# Patient Record
Sex: FEMALE | Race: WHITE | NOT HISPANIC OR LATINO | Employment: UNEMPLOYED | URBAN - METROPOLITAN AREA
[De-identification: names, ages, dates, MRNs, and addresses within clinical notes are randomized per-mention and may not be internally consistent; named-entity substitution may affect disease eponyms.]

---

## 2017-05-04 ENCOUNTER — ALLSCRIPTS OFFICE VISIT (OUTPATIENT)
Dept: OTHER | Facility: OTHER | Age: 67
End: 2017-05-04

## 2017-05-04 DIAGNOSIS — R10.9 ABDOMINAL PAIN: ICD-10-CM

## 2017-05-04 DIAGNOSIS — E03.9 HYPOTHYROIDISM: ICD-10-CM

## 2017-05-05 ENCOUNTER — APPOINTMENT (OUTPATIENT)
Dept: LAB | Facility: HOSPITAL | Age: 67
End: 2017-05-05
Payer: MEDICARE

## 2017-05-05 DIAGNOSIS — R10.9 ABDOMINAL PAIN: ICD-10-CM

## 2017-05-05 PROCEDURE — 87086 URINE CULTURE/COLONY COUNT: CPT

## 2017-05-06 ENCOUNTER — GENERIC CONVERSION - ENCOUNTER (OUTPATIENT)
Dept: OTHER | Facility: OTHER | Age: 67
End: 2017-05-06

## 2017-05-06 LAB — BACTERIA UR CULT: NORMAL

## 2017-08-07 DIAGNOSIS — E03.9 HYPOTHYROIDISM: ICD-10-CM

## 2017-08-17 ENCOUNTER — GENERIC CONVERSION - ENCOUNTER (OUTPATIENT)
Dept: OTHER | Facility: OTHER | Age: 67
End: 2017-08-17

## 2017-08-22 ENCOUNTER — GENERIC CONVERSION - ENCOUNTER (OUTPATIENT)
Dept: OTHER | Facility: OTHER | Age: 67
End: 2017-08-22

## 2017-08-25 ENCOUNTER — ALLSCRIPTS OFFICE VISIT (OUTPATIENT)
Dept: OTHER | Facility: OTHER | Age: 67
End: 2017-08-25

## 2017-10-02 DIAGNOSIS — E03.9 HYPOTHYROIDISM: ICD-10-CM

## 2017-11-16 ENCOUNTER — GENERIC CONVERSION - ENCOUNTER (OUTPATIENT)
Dept: OTHER | Facility: OTHER | Age: 67
End: 2017-11-16

## 2018-01-12 VITALS
SYSTOLIC BLOOD PRESSURE: 136 MMHG | HEIGHT: 66 IN | BODY MASS INDEX: 33.49 KG/M2 | TEMPERATURE: 96.8 F | HEART RATE: 87 BPM | DIASTOLIC BLOOD PRESSURE: 72 MMHG | RESPIRATION RATE: 16 BRPM | WEIGHT: 208.38 LBS

## 2018-01-12 NOTE — PROGRESS NOTES
Assessment    1  Encounter for preventive health examination (V70 0) (Z00 00)    Plan  Cough    · Promethazine-Codeine 6 25-10 MG/5ML Oral Syrup  Hypothyroidism    · (1) COMPREHENSIVE METABOLIC PANEL; Status:Active; Requested for:[id number];    · (1) LIPID PANEL FASTING W DIRECT LDL REFLEX; Status:Active; Requested  for:34Ism9918;   Screening for genitourinary condition    · *VB - Urinary Incontinence Screen (Dx Z13 89 Screen for UI); Status:Active; Requested  for:67Dnf0214;     Discussion/Summary    Due for lipid profile with next lab  Impression: Initial Annual Wellness Visit, with preventive exam as well as age and risk appropriate counseling completed  Cardiovascular screening and counseling: due for a lipid panel  Diabetes screening and counseling: screening is current  Colorectal cancer screening and counseling: screening is current  Breast cancer screening and counseling: screening is current  Cervical cancer screening and counseling: screening not indicated  Osteoporosis screening and counseling: due for DXA axial skeleton  Abdominal aortic aneurysm screening and counseling: screening not indicated  Glaucoma screening and counseling: screening is current  HIV screening and counseling: screening not indicated  Immunizations: the patient declines the influenza vaccination, the patient declines the pneumococcal vaccination, the patient declines the Zostavax vaccine and the patient declines the Tdap vaccine  Advance Directive Planning: not complete, she was encouraged to follow-up with me to discuss her questions and/or decisions  Patient Discussion: follow-up visit needed in one year  The treatment plan was reviewed with the patient/guardian   The patient/guardian understands and agrees with the treatment plan      Chief Complaint  Pt here for AWV      Advance Directives  Advance Directive [facility]:   The patient is in agreement to receive the Advance Care Planning service   Durable Power  [facility]:    Name: Nichole Gilbert   Relationship: son   Capacity/Competence: This patient has full decision making capacity for discussion of advance care planning and This patient has full decision making competency for discussion of advance care planning  History of Present Illness  Welcome to Medicare and Wellness Visits: The patient is being seen for the initial annual wellness visit  Medicare Screening and Risk Factors   Hospitalizations: she has been previously hospitalizied and she has been hospitalized 3-childbirth, breast surgery, abdominal pain times  Once per lifetime medicare screening tests: ECG has not been done and AAA screening US has not yet been done  Medicare Screening Tests Risk Questions   Abdominal aortic aneurysm risk assessment: none indicated  Osteoporosis risk assessment: , female gender and over 48years of age  HIV risk assessment: none indicated  Drug and Alcohol Use: The patient has never smoked cigarettes and has never used smokeless tobacco  The patient reports never drinking alcohol  Alcohol concern:   The patient has no concerns about alcohol abuse  She has never used illicit drugs  Diet and Physical Activity: Current diet includes low salt food choices, 1 servings of fruit per day, 1 servings of vegetables per day, 1 servings of meat per day, 3 servings of whole grains per day, 1 servings of simple carbohydrates per day, 1 servings of dairy products per day and 2 cups of coffee per day  She exercises 2-3 times per week  Exercise: walking 30 minutes per day  Mood Disorder and Cognitive Impairment Screening: PHQ-9 Depression Scale   Over the past 2 weeks, how often have you been bothered by the following problems? 1 ) Little interest or pleasure in doing things? Half the days or more  2 ) Feeling down, depressed or hopeless? Nearly every day  3 ) Trouble falling asleep or sleeping too much? Nearly every day     4 ) Feeling tired or having little energy? Nearly every day  5 ) Poor appetite or overeating? Not at all    6 ) Feeling bad about yourself, or that you are a failure, or have let yourself or your family down? Nearly every day    7 ) Trouble concentrating on things, such as reading a newspaper or watching television? Half the days or more  8 ) Moving or speaking so slowly that other people could have noticed, or the opposite, moving or speaking faster than usual? Several days  How difficult have these problems made it for you to do your work, take care of things at home, or get along with people? Somewhat difficult  Cognitive impairment screening: denies difficulty learning/retaining new information, denies difficulty handling complex tasks, denies difficulty with reasoning, denies difficulty with spatial ability and orientation, denies difficulty with language and denies difficulty with behavior  Functional Ability/Level of Safety: Hearing is slightly decreased, slightly decreased in the right ear and slightly decreased in the left ear  She uses a hearing aid  The patient is currently able to participate in social activities with limitations, but able to do activities of daily living without limitations, able to do instrumental activities of daily living without limitations and able to drive without limitations  Activities of daily living details: transportation help needed, meal preparation help needed and needs help managing medications, but does not need help using the phone, does not need help shopping, does not need help doing housework, does not need help doing laundry and does not need help managing money  Fall risk factors: The patient fell 0 times in the past 12 months  Injury History: urinary incontinence  Home safety risk factors:  no unfamiliar surroundings, no loose rugs, no poor household lighting, no uneven floors, no household clutter, grab bars in the bathroom and handrails on the stairs     Advance Directives: Advance directives: no living will, no durable power of  for health care directives and no advance directives  Co-Managers and Medical Equipment/Suppliers: See Patient Care Team   Preventive Quality Program 65 and Older: The patient is currently experiencing urinary symptoms  Urinary Incontinence Symptoms includes: urinary incontinence, incomplete bladder emptying, urinary frequency, urinary urgency, urinary hesitancy, dysuria, nocturia, straining, weak stream, intermittent stream, post-void dribbling, vaginal pressure and vaginal dryness        Patient Care Team    Care Team Member Role Specialty Office Number   [address] [facility] Specialist Surgical Oncology [phone]   Corby Kidd DO Specialist Ophthalmology [phone] [facility] Specialist Hematology Oncology [phone]   Eric Garcia MD Specialist Pulmonary Medicine [phone]   Anju Maya MD  Family Medicine [phone]     Review of Systems    Cardiovascular: no chest pain  Respiratory: no shortness of breath  Gastrointestinal: abdominal pain, diarrhea, constipation and constipation alternating with diarrhea  Genitourinary: urinary urgency  Neurological: no headache  Active Problems    1  Abdominal pain (789 00) (R10 9)   2  Anxiety (300 00) (F41 9)   3  Cough (786 2) (R05)   4  Depression (311) (F32 9)   5  Encounter for screening colonoscopy (V76 51) (Z12 11)   6  Encounter for special screening examination for genitourinary disorder (V81 6) (Z13 89)   7  Gallbladder disease (575 9) (K82 9)   8  Hypertension (401 9) (I10)   9  Hypothyroidism (244 9) (E03 9)   10  Left foot pain (729 5) (M79 672)   11  Lyme disease (088 81) (A69 20)   12  Mild intermittent asthma without complication (734 08) (E07 94)   13  Need for Tdap vaccination (V06 1) (Z23)   14  Need for vaccination with 13-polyvalent pneumococcal conjugate vaccine (V03 82) (Z23)   15   Urinary hesitancy (788 64) (R39 11)    Past Medical History    · History of Bladder pain (788 99) (R39 89)   · History of Knee pain (719 46) (M25 569)   · History of Sinusitis, acute, maxillary (461 0) (J01 00)   · History of URI (upper respiratory infection) (465 9) (J06 9)    The active problems and past medical history were reviewed and updated today  Surgical History    · History of Bladder Surgery   · History of Breast Surgery Lumpectomy    The surgical history was reviewed and updated today  Family History  Mother    · Family history of Tuberculosis    The family history was reviewed and updated today  Social History    · Always uses seat belt   · Apartment   · Caffeine use (V49 89) (F15 90)   · Denies alcohol consumption (V49 89) (Z78 9)   · Feels safe at home   · Living alone (V60 3) (Z60 2)   · Lyme disease (088 81) (A69 20)   · Never smoker   · No advance directives (V49 89) (Z78 9)   · No alcohol use   · No history of alcohol use (Z78 9)   · No living will   · No Advent beliefs   · One child   · Retired   · Single   · Single   · Supportive and safe  The social history was reviewed and updated today  Current Meds   1  Albuterol Sulfate (2 5 MG/3ML) 0 083% Inhalation Nebulization Solution; USE 1 UNIT   DOSE EVERY 4-6 HOURS AS NEEDED FOR WHEEZING ; Therapy: 37TZR9354 to (Evaluate:67Stn2853)  Requested for: 29TCE7257; Last   Rx:12Nov2015 Ordered   2  Gracey Thyroid 120 MG Oral Tablet; TAKE 1 TABLET DAILY; Therapy: 97ZVL5246 to (Unique Bis)  Requested for: 26Buf1693; Last   Rx:80Tvl0953 Ordered   3  Exemestane 25 MG Oral Tablet; USE AS DIRECTED Recorded   4  Losartan Potassium 100 MG Oral Tablet; TAKE 1 TABLET DAILY; Therapy: 69Tkd2671 to (Evaluate:13Jan2018)  Requested for: 96KOV4899; Last   Rx:71Wig4283 Ordered   5  Promethazine-Codeine 6 25-10 MG/5ML Oral Syrup; TAKE 5 ML EVERY 4 TO 6 HOURS   AS NEEDED FOR COUGH; Therapy: 98Ftg3300 to (Evaluate:14Jun2017); Last Rx:09Jun2017 Ordered   6  Proventil  (90 Base) MCG/ACT Inhalation Aerosol Solution; INHALE 1 PUFF   EVERY 4 HOURS AS NEEDED; Therapy: 53KNI4973 to (Evaluate:11Oct2015)  Requested for: 29Oxn6117; Last   Rx:19Wyr6612 Ordered   7  RaNITidine HCl - 150 MG Oral Tablet; take 1 tablet by mouth twice a day as needed foir   stomach; Therapy: 92PDQ9388 to (Milagro Perdomo)  Requested for: 24MPH0559; Last   RE:50ZTF8400 Ordered    The medication list was reviewed and updated today  Allergies    1  Cipro   2  Sulfa Drugs    3  Dust Mite   4  Grass   5  No Known Food Allergies    Immunizations   1    PPSV  Permanently Deferred: Medical Deferral, 93HJW6818    Tdap  Permanently Deferred: Medical Deferral, 97CET6039     Vitals  Signs    Temperature: 96 8 F  Heart Rate: 87  Respiration: 16  Systolic: 960  Diastolic: 72  Height: 5 ft 6 in  Weight: 208 lb 6 oz  BMI Calculated: 33 63  BSA Calculated: 2 04    Physical Exam    Constitutional   General appearance: Abnormal   obese and appears tired  Ears, Nose, Mouth, and Throat   Otoscopic examination: Tympanic membranes translucent with normal light reflex  Canals patent without erythema  Oropharynx: Normal with no erythema, edema, exudate or lesions  Neck   Thyroid: Normal, no thyromegaly  Pulmonary   Auscultation of lungs: Clear to auscultation  Cardiovascular   Auscultation of heart: Normal rate and rhythm, normal S1 and S2, no murmurs  Abdomen   Abdomen: Non-tender, no masses  Lymphatic   Palpation of lymph nodes in neck: No lymphadenopathy  Results/Data  PHQ-9 Adult Depression Screening 90Tdf6427 11:30AM User, Ahs     Test Name Result Flag Reference   PHQ-9 Adult Depression Score 13     Over the last two weeks, how often have you been bothered by any of the following problems?   Little interest or pleasure in doing things: Several days - 1  Feeling down, depressed, or hopeless: More than half the days - 2  Trouble falling or staying asleep, or sleeping too much: Several days - 1  Feeling tired or having little energy: More than half the days - 2  Poor appetite or over eating: Not at all - 0  Feeling bad about yourself - or that you are a failure or have let yourself or your family down: More than half the days - 2  Trouble concentrating on things, such as reading the newspaper or watching television: More than half the days - 2  Moving or speaking so slowly that other people could have noticed  Or the opposite -  being so fidgety or restless that you have been moving around a lot more than usual: Several days - 1  Thoughts that you would be better off dead, or of hurting yourself in some way: More than half the days - 2   PHQ-9 Adult Depression Screening Positive     PHQ-9 Difficulty Level Somewhat difficult     PHQ-9 Severity Moderate Depression         Signatures   Electronically signed by :  Kellen Toure MD; Aug 25 2017 12:11PM EST                       (Author)

## 2018-01-15 VITALS
HEIGHT: 66 IN | BODY MASS INDEX: 33.17 KG/M2 | SYSTOLIC BLOOD PRESSURE: 140 MMHG | TEMPERATURE: 99.1 F | HEART RATE: 85 BPM | DIASTOLIC BLOOD PRESSURE: 70 MMHG | WEIGHT: 206.38 LBS

## 2018-03-05 ENCOUNTER — TELEPHONE (OUTPATIENT)
Dept: FAMILY MEDICINE CLINIC | Facility: CLINIC | Age: 68
End: 2018-03-05

## 2018-03-05 DIAGNOSIS — I10 ESSENTIAL HYPERTENSION: ICD-10-CM

## 2018-03-05 DIAGNOSIS — E03.9 HYPOTHYROIDISM, UNSPECIFIED TYPE: Primary | ICD-10-CM

## 2018-03-05 NOTE — TELEPHONE ENCOUNTER
Pt spouse notified  Pls advise when new blood work script is generated, pt will like mailed to home

## 2018-03-05 NOTE — TELEPHONE ENCOUNTER
Patient called in stating she used her last prescription for her thyroid  Is she staying on this or does she need a test done?

## 2018-03-14 ENCOUNTER — TELEPHONE (OUTPATIENT)
Dept: FAMILY MEDICINE CLINIC | Facility: CLINIC | Age: 68
End: 2018-03-14

## 2018-03-14 NOTE — TELEPHONE ENCOUNTER
LVMTCB      ----- Message from Li Yeung MD sent at 3/14/2018 12:29 PM EDT -----  Cholesterol too high, thyroid overcorrected-due ov

## 2018-03-16 ENCOUNTER — TELEPHONE (OUTPATIENT)
Dept: FAMILY MEDICINE CLINIC | Facility: CLINIC | Age: 68
End: 2018-03-16

## 2018-03-16 ENCOUNTER — OFFICE VISIT (OUTPATIENT)
Dept: FAMILY MEDICINE CLINIC | Facility: CLINIC | Age: 68
End: 2018-03-16
Payer: MEDICARE

## 2018-03-16 VITALS
DIASTOLIC BLOOD PRESSURE: 82 MMHG | BODY MASS INDEX: 33.88 KG/M2 | TEMPERATURE: 98 F | HEIGHT: 65 IN | WEIGHT: 203.38 LBS | RESPIRATION RATE: 16 BRPM | HEART RATE: 80 BPM | SYSTOLIC BLOOD PRESSURE: 120 MMHG

## 2018-03-16 DIAGNOSIS — R10.9 ABDOMINAL PAIN, UNSPECIFIED ABDOMINAL LOCATION: ICD-10-CM

## 2018-03-16 DIAGNOSIS — E78.5 HYPERLIPIDEMIA, UNSPECIFIED HYPERLIPIDEMIA TYPE: ICD-10-CM

## 2018-03-16 DIAGNOSIS — R19.7 DIARRHEA, UNSPECIFIED TYPE: Primary | ICD-10-CM

## 2018-03-16 DIAGNOSIS — R10.12 LEFT UPPER QUADRANT PAIN: Primary | ICD-10-CM

## 2018-03-16 DIAGNOSIS — E03.9 HYPOTHYROIDISM, UNSPECIFIED TYPE: ICD-10-CM

## 2018-03-16 DIAGNOSIS — I10 ESSENTIAL HYPERTENSION: ICD-10-CM

## 2018-03-16 PROBLEM — R32 URINARY INCONTINENCE: Status: ACTIVE | Noted: 2017-08-25

## 2018-03-16 PROBLEM — J45.20 MILD INTERMITTENT ASTHMA WITHOUT COMPLICATION: Status: ACTIVE | Noted: 2017-05-04

## 2018-03-16 PROCEDURE — 99214 OFFICE O/P EST MOD 30 MIN: CPT | Performed by: FAMILY MEDICINE

## 2018-03-16 RX ORDER — ALBUTEROL SULFATE 2.5 MG/3ML
1 SOLUTION RESPIRATORY (INHALATION)
COMMUNITY
Start: 2015-11-12 | End: 2020-02-21 | Stop reason: ALTCHOICE

## 2018-03-16 RX ORDER — EXEMESTANE 25 MG/1
TABLET ORAL 2 TIMES DAILY
COMMUNITY
End: 2018-06-18 | Stop reason: SDUPTHER

## 2018-03-16 RX ORDER — MAGNESIUM GLUCONATE 30 MG(550)
30 TABLET ORAL
COMMUNITY

## 2018-03-16 RX ORDER — EXEMESTANE 25 MG/1
25 TABLET ORAL
COMMUNITY
Start: 2018-03-15 | End: 2018-03-16 | Stop reason: SDUPTHER

## 2018-03-16 RX ORDER — DIPHENOXYLATE HYDROCHLORIDE AND ATROPINE SULFATE 2.5; .025 MG/1; MG/1
1 TABLET ORAL
COMMUNITY

## 2018-03-16 RX ORDER — LEVOTHYROXINE AND LIOTHYRONINE 76; 18 UG/1; UG/1
TABLET ORAL
COMMUNITY
Start: 2017-02-16 | End: 2018-03-16 | Stop reason: SDUPTHER

## 2018-03-16 RX ORDER — RANITIDINE 150 MG/1
TABLET ORAL
COMMUNITY
Start: 2017-05-04 | End: 2020-02-21 | Stop reason: ALTCHOICE

## 2018-03-16 RX ORDER — ALBUTEROL SULFATE 90 UG/1
1 AEROSOL, METERED RESPIRATORY (INHALATION) EVERY 4 HOURS PRN
COMMUNITY
Start: 2014-10-03 | End: 2018-06-18 | Stop reason: SDUPTHER

## 2018-03-16 RX ORDER — COLESEVELAM 180 1/1
1875 TABLET ORAL 2 TIMES DAILY WITH MEALS
Qty: 60 TABLET | Refills: 5 | Status: SHIPPED | OUTPATIENT
Start: 2018-03-16 | End: 2018-03-23 | Stop reason: SDUPTHER

## 2018-03-16 RX ORDER — LOSARTAN POTASSIUM 100 MG/1
1 TABLET ORAL DAILY
COMMUNITY
Start: 2014-08-25 | End: 2018-06-18 | Stop reason: SDUPTHER

## 2018-03-16 RX ORDER — LEVOTHYROXINE AND LIOTHYRONINE 76; 18 UG/1; UG/1
1 TABLET ORAL DAILY
COMMUNITY
Start: 2015-09-30 | End: 2018-04-12 | Stop reason: SDUPTHER

## 2018-03-16 RX ORDER — LOSARTAN POTASSIUM 100 MG/1
100 TABLET ORAL
COMMUNITY
Start: 2014-02-24 | End: 2018-03-16 | Stop reason: SDUPTHER

## 2018-03-16 NOTE — TELEPHONE ENCOUNTER
Patient forgot to mention about getting checked for Hep C since she is a baby boomer  Pls advise  May mail out rx to her home address

## 2018-03-16 NOTE — PROGRESS NOTES
Assessment/Plan:    No problem-specific Assessment & Plan notes found for this encounter  There are no diagnoses linked to this encounter  Subjective:      Patient ID: Terese Irby is a 75 y o  female  Abdominal Pain   This is a chronic problem  The current episode started more than 1 year ago  The onset quality is undetermined  The problem occurs intermittently  The problem has been unchanged  The pain is located in the LUQ and left flank  The pain is at a severity of 4/10  The pain is moderate  The quality of the pain is colicky  Associated symptoms include belching, constipation, diarrhea, flatus and weight loss  Pertinent negatives include no anorexia  The pain is aggravated by eating  The pain is relieved by belching and passing flatus  She has tried antacids, H2 blockers and acetaminophen for the symptoms  The treatment provided mild relief  Prior diagnostic workup includes CT scan and lower endoscopy  There is no history of abdominal surgery  Thyroid Problem   Presents for follow-up visit  Symptoms include anxiety, constipation, diarrhea and weight loss  Patient reports no hair loss or weight gain  The symptoms have been stable  The following portions of the patient's history were reviewed and updated as appropriate: allergies, current medications, past family history, past medical history, past social history, past surgical history and problem list     Review of Systems   Constitutional: Positive for unexpected weight change and weight loss  Negative for weight gain  8 lb   Respiratory: Negative for shortness of breath  Cardiovascular: Negative for chest pain  Gastrointestinal: Positive for abdominal pain, constipation, diarrhea and flatus  Negative for anorexia  Hematological: Negative for adenopathy  Psychiatric/Behavioral: The patient is nervous/anxious            Objective:      /82 (BP Location: Right arm, Patient Position: Sitting, Cuff Size: Adult)   Pulse 80   Temp 98 °F (36 7 °C) (Temporal)   Resp 16   Ht 5' 5 05" (1 652 m)   Wt 92 3 kg (203 lb 6 oz)   BMI 33 79 kg/m²          Physical Exam   Constitutional: She appears well-developed and well-nourished  Neck: No thyromegaly present  Cardiovascular: Normal rate, regular rhythm and normal heart sounds  Pulmonary/Chest: Breath sounds normal    Abdominal: There is tenderness in the left upper quadrant  Musculoskeletal: She exhibits no edema  Lymphadenopathy:     She has no cervical adenopathy

## 2018-03-19 ENCOUNTER — TELEPHONE (OUTPATIENT)
Dept: FAMILY MEDICINE CLINIC | Facility: CLINIC | Age: 68
End: 2018-03-19

## 2018-03-20 NOTE — TELEPHONE ENCOUNTER
L/m on pt 's home # to c/b to inform her to call her ins  To see if colestipol would be covered under her plan since Welchol was rejected by her ins

## 2018-03-23 DIAGNOSIS — E78.5 HYPERLIPIDEMIA, UNSPECIFIED HYPERLIPIDEMIA TYPE: ICD-10-CM

## 2018-03-23 DIAGNOSIS — R19.7 DIARRHEA, UNSPECIFIED TYPE: ICD-10-CM

## 2018-03-23 RX ORDER — COLESEVELAM 180 1/1
1875 TABLET ORAL 2 TIMES DAILY WITH MEALS
Qty: 60 TABLET | Refills: 0 | Status: SHIPPED | OUTPATIENT
Start: 2018-03-23 | End: 2019-02-22 | Stop reason: SDUPTHER

## 2018-03-23 NOTE — TELEPHONE ENCOUNTER
No I cannot delete  That-just call pharmacy and tell them it is 1 tab bid #60-this is a limitation of prescribing through Epic

## 2018-03-23 NOTE — TELEPHONE ENCOUNTER
Instructions still say 3 tablets twice a day  Can you delete that 3 tablets and make it 1 tab bid  And resend to [facility]

## 2018-03-23 NOTE — TELEPHONE ENCOUNTER
Possible rejection for medication could be due to the instructions  As they do not match the frequency a day and or quantity  Instructions are to take 3 tabs by mouth two times a day with meals and 1 tablet for diarrhea  This does not make sense  If you only want patient taking this twice a day to match the qty pls fix instructions to 1 tab twice daily with meal  pls clarify   Thank you

## 2018-04-02 ENCOUNTER — TELEPHONE (OUTPATIENT)
Dept: FAMILY MEDICINE CLINIC | Facility: CLINIC | Age: 68
End: 2018-04-02

## 2018-04-02 NOTE — TELEPHONE ENCOUNTER
Pt called in following up on Hepatitis screening results  Pt went to [facility], results are in pt care everywhere  psl advise with results

## 2018-04-03 ENCOUNTER — TELEPHONE (OUTPATIENT)
Dept: FAMILY MEDICINE CLINIC | Facility: CLINIC | Age: 68
End: 2018-04-03

## 2018-04-06 ENCOUNTER — TELEPHONE (OUTPATIENT)
Dept: FAMILY MEDICINE CLINIC | Facility: CLINIC | Age: 68
End: 2018-04-06

## 2018-04-06 NOTE — TELEPHONE ENCOUNTER
lvmtcb          ----- Message from Randolph Beebe MD sent at 4/6/2018 10:57 AM EDT -----  Hep C negative

## 2018-04-12 ENCOUNTER — TELEPHONE (OUTPATIENT)
Dept: FAMILY MEDICINE CLINIC | Facility: CLINIC | Age: 68
End: 2018-04-12

## 2018-04-12 DIAGNOSIS — E03.9 HYPOTHYROIDISM, UNSPECIFIED TYPE: Primary | ICD-10-CM

## 2018-04-12 RX ORDER — LEVOTHYROXINE AND LIOTHYRONINE 76; 18 UG/1; UG/1
120 TABLET ORAL DAILY
Qty: 30 TABLET | Refills: 2 | Status: SHIPPED | OUTPATIENT
Start: 2018-04-12 | End: 2018-07-11 | Stop reason: SDUPTHER

## 2018-04-12 NOTE — TELEPHONE ENCOUNTER
LVMTCB for results      ----- Message from Dora Dinh MD sent at 4/12/2018  2:35 PM EDT -----  Lab all nl

## 2018-05-16 ENCOUNTER — TELEPHONE (OUTPATIENT)
Dept: FAMILY MEDICINE CLINIC | Facility: CLINIC | Age: 68
End: 2018-05-16

## 2018-05-16 DIAGNOSIS — R05.9 COUGH: Primary | ICD-10-CM

## 2018-05-16 RX ORDER — PROMETHAZINE HYDROCHLORIDE AND CODEINE PHOSPHATE 6.25; 1 MG/5ML; MG/5ML
5 SYRUP ORAL EVERY 4 HOURS PRN
Qty: 120 ML | Refills: 0 | Status: SHIPPED | OUTPATIENT
Start: 2018-05-16 | End: 2018-06-18 | Stop reason: CLARIF

## 2018-05-16 NOTE — TELEPHONE ENCOUNTER
Pt  Gets a Rx for her cough she has all the time for promethazine-codeine 6 25-10mg/5ml  Take 5ml every 4-6hrs as needed  It gets sent to [facility] and [facility]  Not listed in her current meds found it in allscripts

## 2018-06-18 ENCOUNTER — OFFICE VISIT (OUTPATIENT)
Dept: FAMILY MEDICINE CLINIC | Facility: CLINIC | Age: 68
End: 2018-06-18
Payer: MEDICARE

## 2018-06-18 VITALS
HEART RATE: 76 BPM | WEIGHT: 200.6 LBS | RESPIRATION RATE: 18 BRPM | HEIGHT: 65 IN | BODY MASS INDEX: 33.42 KG/M2 | SYSTOLIC BLOOD PRESSURE: 130 MMHG | DIASTOLIC BLOOD PRESSURE: 74 MMHG

## 2018-06-18 DIAGNOSIS — Z17.0 MALIGNANT NEOPLASM OF BREAST IN FEMALE, ESTROGEN RECEPTOR POSITIVE, UNSPECIFIED LATERALITY, UNSPECIFIED SITE OF BREAST (HCC): ICD-10-CM

## 2018-06-18 DIAGNOSIS — R06.00 DYSPNEA, UNSPECIFIED TYPE: ICD-10-CM

## 2018-06-18 DIAGNOSIS — C50.919 MALIGNANT NEOPLASM OF BREAST IN FEMALE, ESTROGEN RECEPTOR POSITIVE, UNSPECIFIED LATERALITY, UNSPECIFIED SITE OF BREAST (HCC): ICD-10-CM

## 2018-06-18 DIAGNOSIS — E03.9 HYPOTHYROIDISM, UNSPECIFIED TYPE: Primary | ICD-10-CM

## 2018-06-18 DIAGNOSIS — J45.20 MILD INTERMITTENT ASTHMA WITHOUT COMPLICATION: ICD-10-CM

## 2018-06-18 DIAGNOSIS — E78.5 HYPERLIPIDEMIA, UNSPECIFIED HYPERLIPIDEMIA TYPE: ICD-10-CM

## 2018-06-18 DIAGNOSIS — I10 ESSENTIAL HYPERTENSION: ICD-10-CM

## 2018-06-18 PROCEDURE — 99214 OFFICE O/P EST MOD 30 MIN: CPT | Performed by: FAMILY MEDICINE

## 2018-06-18 RX ORDER — EXEMESTANE 25 MG/1
25 TABLET ORAL DAILY
Qty: 30 TABLET | Refills: 0
Start: 2018-06-18

## 2018-06-18 RX ORDER — ALBUTEROL SULFATE 90 UG/1
1 AEROSOL, METERED RESPIRATORY (INHALATION) EVERY 4 HOURS PRN
Qty: 1 INHALER | Refills: 5 | Status: SHIPPED | OUTPATIENT
Start: 2018-06-18 | End: 2019-06-21 | Stop reason: ALTCHOICE

## 2018-06-18 RX ORDER — LOSARTAN POTASSIUM 100 MG/1
100 TABLET ORAL DAILY
Qty: 30 TABLET | Refills: 5 | Status: SHIPPED | OUTPATIENT
Start: 2018-06-18 | End: 2019-01-08 | Stop reason: SDUPTHER

## 2018-06-18 NOTE — ASSESSMENT & PLAN NOTE
Has been on 5 years-new doc wants her on another 5 years-? whether could take every other day due to hot flashes

## 2018-06-18 NOTE — PATIENT INSTRUCTIONS
Check with cancer doc whether could take med for breast cancer every other day to decrease hot flashes, restart inhaler due to wheezing,check with pharmacy whether they give tetanus shot

## 2018-06-18 NOTE — PROGRESS NOTES
Assessment/Plan:    No problem-specific Assessment & Plan notes found for this encounter  There are no diagnoses linked to this encounter  Subjective:      Patient ID: Ismael Jo is a 70 y o  female  Hypertension   This is a chronic problem  The current episode started more than 1 year ago  The problem is unchanged  The problem is controlled  Associated symptoms include chest pain and shortness of breath  Pertinent negatives include no anxiety, headaches or peripheral edema  Risk factors for coronary artery disease include obesity, post-menopausal state and dyslipidemia  Past treatments include angiotensin blockers  The current treatment provides no improvement  There are no compliance problems  The following portions of the patient's history were reviewed and updated as appropriate: allergies, current medications, past family history, past medical history, past social history, past surgical history and problem list       Review of Systems   Constitutional: Negative for chills, fatigue and unexpected weight change  Hot flashes   Respiratory: Positive for cough, shortness of breath and wheezing  Cardiovascular: Positive for chest pain  Gastrointestinal: Positive for abdominal pain  Negative for constipation  Neurological: Negative for headaches  Objective:      /74 (BP Location: Right arm, Patient Position: Sitting, Cuff Size: Adult)   Pulse 76   Resp 18   Ht 5' 5 05" (1 652 m)   Wt 91 kg (200 lb 9 6 oz)   BMI 33 33 kg/m²          Physical Exam   Constitutional: She appears well-developed and well-nourished  Neck: No thyromegaly present  Cardiovascular: Normal rate, regular rhythm and normal heart sounds  Pulmonary/Chest: She has wheezes in the right lower field  Abdominal: She exhibits no distension  Lymphadenopathy:     She has no cervical adenopathy  Vitals reviewed

## 2018-06-22 ENCOUNTER — TELEPHONE (OUTPATIENT)
Dept: FAMILY MEDICINE CLINIC | Facility: CLINIC | Age: 68
End: 2018-06-22

## 2018-06-22 NOTE — TELEPHONE ENCOUNTER
Patient will be having a stress test and she wants to know if he can have her Blood pressure, thyroid and cancer medication  Please advice

## 2018-06-29 ENCOUNTER — TELEPHONE (OUTPATIENT)
Dept: FAMILY MEDICINE CLINIC | Facility: CLINIC | Age: 68
End: 2018-06-29

## 2018-06-29 NOTE — TELEPHONE ENCOUNTER
Pt stopped in office was just at hospital for stress test  She stopped over and complains of having hives for past 3-4 days have hives all over body  Very itchy  Ester Caldera who was with her believes her nerves is bringing out the hives/rash  Lips are swollen  She has not eaten or taken anything out of the ordinary  Not allergic to anything or any medications  Hospital recommended her to follow up with pcp  Did not tell them to go to ER  Pls advise

## 2018-07-10 DIAGNOSIS — E03.9 HYPOTHYROIDISM, UNSPECIFIED TYPE: ICD-10-CM

## 2018-07-10 RX ORDER — THYROID, PORCINE 120 MG/1
TABLET ORAL
Qty: 30 TABLET | OUTPATIENT
Start: 2018-07-10

## 2018-07-11 DIAGNOSIS — E03.9 HYPOTHYROIDISM, UNSPECIFIED TYPE: ICD-10-CM

## 2018-07-11 RX ORDER — LEVOTHYROXINE AND LIOTHYRONINE 76; 18 UG/1; UG/1
120 TABLET ORAL DAILY
Qty: 30 TABLET | Refills: 5 | Status: SHIPPED | OUTPATIENT
Start: 2018-07-11 | End: 2019-02-05 | Stop reason: SDUPTHER

## 2018-07-12 ENCOUNTER — OFFICE VISIT (OUTPATIENT)
Dept: FAMILY MEDICINE CLINIC | Facility: CLINIC | Age: 68
End: 2018-07-12
Payer: MEDICARE

## 2018-07-12 VITALS
WEIGHT: 200.13 LBS | HEIGHT: 65 IN | SYSTOLIC BLOOD PRESSURE: 126 MMHG | BODY MASS INDEX: 33.34 KG/M2 | DIASTOLIC BLOOD PRESSURE: 68 MMHG | TEMPERATURE: 99 F | RESPIRATION RATE: 18 BRPM | HEART RATE: 76 BPM

## 2018-07-12 DIAGNOSIS — L50.9 URTICARIA: Primary | ICD-10-CM

## 2018-07-12 PROCEDURE — 99213 OFFICE O/P EST LOW 20 MIN: CPT | Performed by: FAMILY MEDICINE

## 2018-07-12 RX ORDER — METHYLPREDNISOLONE 4 MG/1
TABLET ORAL
Qty: 21 TABLET | Refills: 0 | Status: SHIPPED | OUTPATIENT
Start: 2018-07-12 | End: 2018-10-22 | Stop reason: CLARIF

## 2018-07-12 NOTE — PROGRESS NOTES
Assessment/Plan:    No problem-specific Assessment & Plan notes found for this encounter  There are no diagnoses linked to this encounter  Subjective:      Patient ID: Lisseth Vogt is a 70 y o  female  Rash   This is a new problem  The current episode started 1 to 4 weeks ago  The problem has been gradually improving since onset  The rash is diffuse (was worked up about stress test)  The rash is characterized by itchiness and redness (lips even swelled up)  It is unknown if there was an exposure to a precipitant  Pertinent negatives include no facial edema or shortness of breath  Past treatments include antihistamine  The treatment provided mild relief  The following portions of the patient's history were reviewed and updated as appropriate: allergies, current medications, past family history, past medical history, past social history, past surgical history and problem list       Review of Systems   Constitutional: Negative for activity change, appetite change and unexpected weight change  Respiratory: Negative for shortness of breath  Cardiovascular: Negative for chest pain  Musculoskeletal: Negative for arthralgias  Skin: Positive for rash  Neurological: Negative for headaches  Psychiatric/Behavioral: The patient is nervous/anxious  Objective:      /68 (BP Location: Right arm, Patient Position: Sitting, Cuff Size: Standard)   Pulse 76   Temp 99 °F (37 2 °C) (Temporal)   Resp 18   Ht 5' 5 05" (1 652 m)   Wt 90 8 kg (200 lb 2 oz)   BMI 33 25 kg/m²          Physical Exam   Constitutional: She appears well-developed and well-nourished  Neck: No thyromegaly present  Cardiovascular: Normal rate, regular rhythm and normal heart sounds  Pulmonary/Chest: Breath sounds normal    Lymphadenopathy:     She has no cervical adenopathy  Skin: Rash noted  Vitals reviewed

## 2018-08-16 ENCOUNTER — TELEPHONE (OUTPATIENT)
Dept: FAMILY MEDICINE CLINIC | Facility: CLINIC | Age: 68
End: 2018-08-16

## 2018-08-16 DIAGNOSIS — E03.9 HYPOTHYROIDISM, UNSPECIFIED TYPE: Primary | ICD-10-CM

## 2018-08-16 NOTE — TELEPHONE ENCOUNTER
----- Message from Aranza Hutchinson MD sent at 8/16/2018  9:11 AM EDT -----  Thyroid overcorrected-stop thyroid med, cholesterol too high-did she stop welchol? If not would add zetia 10 mg/day -30 day or 90 day?  Recheck thyroid lab 6  weeks

## 2018-08-23 DIAGNOSIS — E78.5 HYPERLIPIDEMIA, UNSPECIFIED HYPERLIPIDEMIA TYPE: Primary | ICD-10-CM

## 2018-08-23 RX ORDER — EZETIMIBE 10 MG/1
10 TABLET ORAL DAILY
Qty: 30 TABLET | Refills: 5 | Status: SHIPPED | OUTPATIENT
Start: 2018-08-23 | End: 2019-02-05 | Stop reason: SDUPTHER

## 2018-09-24 ENCOUNTER — TELEPHONE (OUTPATIENT)
Dept: FAMILY MEDICINE CLINIC | Facility: CLINIC | Age: 68
End: 2018-09-24

## 2018-09-24 DIAGNOSIS — E03.9 HYPOTHYROIDISM, UNSPECIFIED TYPE: Primary | ICD-10-CM

## 2018-09-24 NOTE — TELEPHONE ENCOUNTER
----- Message from Julio Nicole MD sent at 9/24/2018  6:08 PM EDT -----  Thyroid not too underactive-restart meds, rceheck T4/TSH  1  month

## 2018-10-22 ENCOUNTER — OFFICE VISIT (OUTPATIENT)
Dept: FAMILY MEDICINE CLINIC | Facility: CLINIC | Age: 68
End: 2018-10-22
Payer: MEDICARE

## 2018-10-22 VITALS
TEMPERATURE: 98.9 F | WEIGHT: 210.4 LBS | SYSTOLIC BLOOD PRESSURE: 134 MMHG | HEART RATE: 78 BPM | BODY MASS INDEX: 35.06 KG/M2 | RESPIRATION RATE: 18 BRPM | HEIGHT: 65 IN | DIASTOLIC BLOOD PRESSURE: 72 MMHG

## 2018-10-22 DIAGNOSIS — E03.9 HYPOTHYROIDISM, UNSPECIFIED TYPE: Primary | ICD-10-CM

## 2018-10-22 DIAGNOSIS — E78.5 HYPERLIPIDEMIA, UNSPECIFIED HYPERLIPIDEMIA TYPE: ICD-10-CM

## 2018-10-22 PROCEDURE — 99213 OFFICE O/P EST LOW 20 MIN: CPT | Performed by: FAMILY MEDICINE

## 2018-10-22 NOTE — PROGRESS NOTES
Assessment/Plan:    No problem-specific Assessment & Plan notes found for this encounter  Diagnoses and all orders for this visit:    Hypothyroidism, unspecified type  -     Lipid Panel with Direct LDL reflex; Future  -     TSH, 3rd generation; Future    Hyperlipidemia, unspecified hyperlipidemia type  -     Lipid Panel with Direct LDL reflex; Future  -     TSH, 3rd generation; Future          Subjective:      Patient ID: Felipe Mendoza is a 70 y o  female  Hypertension   This is a chronic problem  The current episode started more than 1 year ago  The problem is unchanged  The problem is controlled  Pertinent negatives include no anxiety, blurred vision, chest pain, headaches, peripheral edema or shortness of breath  There are no associated agents to hypertension  Risk factors for coronary artery disease include obesity, sedentary lifestyle and post-menopausal state  Past treatments include angiotensin blockers  The current treatment provides significant improvement  There are no compliance problems  Identifiable causes of hypertension include a thyroid problem  Thyroid Problem   Presents for follow-up visit  Patient reports no anxiety, cold intolerance, constipation, depressed mood, diarrhea, fatigue, weight gain or weight loss  The symptoms have been stable  The following portions of the patient's history were reviewed and updated as appropriate: allergies, current medications, past family history, past medical history, past social history, past surgical history and problem list       Review of Systems   Constitutional: Negative for activity change, appetite change, fatigue, weight gain and weight loss  Eyes: Negative for blurred vision  Respiratory: Negative for shortness of breath  Cardiovascular: Negative for chest pain  Gastrointestinal: Negative for constipation and diarrhea  Endocrine: Negative for cold intolerance  Neurological: Negative for headaches     Psychiatric/Behavioral: The patient is not nervous/anxious  Objective:      /72   Pulse 78   Temp 98 9 °F (37 2 °C) (Temporal)   Resp 18   Ht 5' 5 05" (1 652 m)   Wt 95 4 kg (210 lb 6 4 oz)   BMI 34 96 kg/m²          Physical Exam   Constitutional: She appears well-developed and well-nourished  Neck: No thyromegaly present  Cardiovascular: Normal rate, regular rhythm and normal heart sounds  Pulmonary/Chest: Breath sounds normal    Musculoskeletal: She exhibits no edema  Lymphadenopathy:     She has no cervical adenopathy  Vitals reviewed

## 2019-01-08 DIAGNOSIS — I10 ESSENTIAL HYPERTENSION: ICD-10-CM

## 2019-01-08 RX ORDER — LOSARTAN POTASSIUM 100 MG/1
100 TABLET ORAL DAILY
Qty: 30 TABLET | Refills: 0 | Status: SHIPPED | OUTPATIENT
Start: 2019-01-08 | End: 2019-02-22 | Stop reason: SDUPTHER

## 2019-01-15 ENCOUNTER — TELEPHONE (OUTPATIENT)
Dept: FAMILY MEDICINE CLINIC | Facility: CLINIC | Age: 69
End: 2019-01-15

## 2019-01-15 NOTE — TELEPHONE ENCOUNTER
----- Message from Chelsy Delarosa MD sent at 1/14/2019 10:34 AM EST -----  Lipids really not better, thyroid stable-is pt still taking zetia (ezetimide)

## 2019-02-05 DIAGNOSIS — E78.5 HYPERLIPIDEMIA, UNSPECIFIED HYPERLIPIDEMIA TYPE: ICD-10-CM

## 2019-02-05 DIAGNOSIS — E03.9 HYPOTHYROIDISM, UNSPECIFIED TYPE: ICD-10-CM

## 2019-02-06 RX ORDER — EZETIMIBE 10 MG/1
10 TABLET ORAL DAILY
Qty: 30 TABLET | Refills: 5 | Status: SHIPPED | OUTPATIENT
Start: 2019-02-06

## 2019-02-06 RX ORDER — LEVOTHYROXINE AND LIOTHYRONINE 76; 18 UG/1; UG/1
120 TABLET ORAL DAILY
Qty: 30 TABLET | Refills: 5 | Status: SHIPPED | OUTPATIENT
Start: 2019-02-06 | End: 2019-06-21 | Stop reason: SDUPTHER

## 2019-02-22 ENCOUNTER — OFFICE VISIT (OUTPATIENT)
Dept: FAMILY MEDICINE CLINIC | Facility: CLINIC | Age: 69
End: 2019-02-22
Payer: MEDICARE

## 2019-02-22 VITALS
SYSTOLIC BLOOD PRESSURE: 134 MMHG | HEART RATE: 79 BPM | BODY MASS INDEX: 34.82 KG/M2 | WEIGHT: 209 LBS | DIASTOLIC BLOOD PRESSURE: 80 MMHG | TEMPERATURE: 97.9 F | OXYGEN SATURATION: 97 % | HEIGHT: 65 IN

## 2019-02-22 DIAGNOSIS — E78.5 HYPERLIPIDEMIA, UNSPECIFIED HYPERLIPIDEMIA TYPE: ICD-10-CM

## 2019-02-22 DIAGNOSIS — Z23 NEED FOR PNEUMOCOCCAL VACCINATION: Primary | ICD-10-CM

## 2019-02-22 DIAGNOSIS — I10 ESSENTIAL HYPERTENSION: ICD-10-CM

## 2019-02-22 DIAGNOSIS — R19.7 DIARRHEA, UNSPECIFIED TYPE: ICD-10-CM

## 2019-02-22 PROCEDURE — 99213 OFFICE O/P EST LOW 20 MIN: CPT | Performed by: FAMILY MEDICINE

## 2019-02-22 PROCEDURE — 90670 PCV13 VACCINE IM: CPT | Performed by: FAMILY MEDICINE

## 2019-02-22 PROCEDURE — G0009 ADMIN PNEUMOCOCCAL VACCINE: HCPCS | Performed by: FAMILY MEDICINE

## 2019-02-22 RX ORDER — COLESEVELAM 180 1/1
TABLET ORAL
Qty: 120 TABLET | Refills: 5 | Status: SHIPPED | OUTPATIENT
Start: 2019-02-22 | End: 2019-03-04

## 2019-02-22 RX ORDER — LOSARTAN POTASSIUM 100 MG/1
100 TABLET ORAL DAILY
Qty: 30 TABLET | Refills: 5 | Status: SHIPPED | OUTPATIENT
Start: 2019-02-22 | End: 2019-08-05 | Stop reason: SDUPTHER

## 2019-02-22 NOTE — PROGRESS NOTES
Assessment/Plan:    Essential hypertension  bp stable    Hyperlipidemia  Change welchol to 2 twice per day       Diagnoses and all orders for this visit:    Need for pneumococcal vaccination  -     PNEUMOCOCCAL CONJUGATE VACCINE 13-VALENT GREATER THAN 6 MONTHS    Essential hypertension  -     losartan (COZAAR) 100 MG tablet; Take 1 tablet (100 mg total) by mouth daily    Diarrhea, unspecified type  -     colesevelam (WELCHOL) 625 mg tablet; 2 tablet twice per day    Hyperlipidemia, unspecified hyperlipidemia type  -     colesevelam (WELCHOL) 625 mg tablet; 2 tablet twice per day  -     Lipid Panel with Direct LDL reflex; Future  -     Comprehensive metabolic panel; Future    Other orders  -     Omega-3 Fatty Acids (FISH OIL PO); Take 2 g by mouth          Subjective:      Patient ID: Sampson Lombardo is a 70 y o  female  Hypertension   This is a chronic problem  The current episode started more than 1 year ago  The problem is unchanged  The problem is controlled  Pertinent negatives include no anxiety, blurred vision, chest pain, headaches, peripheral edema or shortness of breath  There are no associated agents to hypertension  Risk factors for coronary artery disease include post-menopausal state, obesity and dyslipidemia  Past treatments include angiotensin blockers  The current treatment provides significant improvement  There are no compliance problems  The following portions of the patient's history were reviewed and updated as appropriate: allergies, current medications, past family history, past medical history, past social history, past surgical history and problem list       Review of Systems   Constitutional: Negative for activity change, appetite change and unexpected weight change  Eyes: Negative for blurred vision  Respiratory: Negative for shortness of breath  Cardiovascular: Negative for chest pain  Neurological: Negative for headaches     Psychiatric/Behavioral: The patient is not nervous/anxious  Objective:      /80 (BP Location: Right arm, Patient Position: Sitting, Cuff Size: Adult)   Pulse 79   Temp 97 9 °F (36 6 °C) (Temporal)   Ht 5' 5" (1 651 m)   Wt 94 8 kg (209 lb)   SpO2 97%   BMI 34 78 kg/m²          Physical Exam   Constitutional: She appears well-developed and well-nourished  Neck: No thyromegaly present  Cardiovascular: Normal rate, regular rhythm and normal heart sounds  Pulmonary/Chest: Effort normal and breath sounds normal    Musculoskeletal: She exhibits no edema  Lymphadenopathy:     She has no cervical adenopathy  Vitals reviewed

## 2019-03-04 ENCOUNTER — TELEPHONE (OUTPATIENT)
Dept: FAMILY MEDICINE CLINIC | Facility: CLINIC | Age: 69
End: 2019-03-04

## 2019-03-04 DIAGNOSIS — E78.5 HYPERLIPIDEMIA, UNSPECIFIED HYPERLIPIDEMIA TYPE: Primary | ICD-10-CM

## 2019-03-04 RX ORDER — MONTELUKAST SODIUM 4 MG/1
1 TABLET, CHEWABLE ORAL 2 TIMES DAILY
Qty: 60 TABLET | Refills: 3 | Status: SHIPPED | OUTPATIENT
Start: 2019-03-04 | End: 2019-06-21 | Stop reason: CLARIF

## 2019-03-04 NOTE — TELEPHONE ENCOUNTER
Welchol given to the patient was too expensive, asking if she can have an alternative medication to [address] [facility]

## 2019-05-23 ENCOUNTER — TELEPHONE (OUTPATIENT)
Dept: FAMILY MEDICINE CLINIC | Facility: CLINIC | Age: 69
End: 2019-05-23

## 2019-06-12 ENCOUNTER — TELEPHONE (OUTPATIENT)
Dept: FAMILY MEDICINE CLINIC | Facility: CLINIC | Age: 69
End: 2019-06-12

## 2019-06-12 DIAGNOSIS — Z12.39 SCREENING FOR BREAST CANCER: Primary | ICD-10-CM

## 2019-06-21 ENCOUNTER — OFFICE VISIT (OUTPATIENT)
Dept: FAMILY MEDICINE CLINIC | Facility: CLINIC | Age: 69
End: 2019-06-21
Payer: MEDICARE

## 2019-06-21 VITALS
DIASTOLIC BLOOD PRESSURE: 82 MMHG | BODY MASS INDEX: 34.02 KG/M2 | SYSTOLIC BLOOD PRESSURE: 120 MMHG | HEART RATE: 74 BPM | RESPIRATION RATE: 18 BRPM | HEIGHT: 65 IN | WEIGHT: 204.2 LBS | TEMPERATURE: 98.6 F

## 2019-06-21 DIAGNOSIS — E03.9 HYPOTHYROIDISM, UNSPECIFIED TYPE: ICD-10-CM

## 2019-06-21 DIAGNOSIS — E78.5 HYPERLIPIDEMIA, UNSPECIFIED HYPERLIPIDEMIA TYPE: ICD-10-CM

## 2019-06-21 DIAGNOSIS — I10 ESSENTIAL HYPERTENSION: ICD-10-CM

## 2019-06-21 DIAGNOSIS — Z00.00 MEDICARE ANNUAL WELLNESS VISIT, SUBSEQUENT: Primary | ICD-10-CM

## 2019-06-21 DIAGNOSIS — M54.50 ACUTE BILATERAL LOW BACK PAIN WITHOUT SCIATICA: ICD-10-CM

## 2019-06-21 PROCEDURE — 99214 OFFICE O/P EST MOD 30 MIN: CPT | Performed by: FAMILY MEDICINE

## 2019-06-21 PROCEDURE — G0439 PPPS, SUBSEQ VISIT: HCPCS | Performed by: FAMILY MEDICINE

## 2019-06-21 RX ORDER — LEVOTHYROXINE AND LIOTHYRONINE 76; 18 UG/1; UG/1
120 TABLET ORAL DAILY
Qty: 30 TABLET | Refills: 5 | Status: SHIPPED | OUTPATIENT
Start: 2019-06-21 | End: 2019-10-21 | Stop reason: SDUPTHER

## 2019-06-21 RX ORDER — FENOFIBRATE 54 MG/1
54 TABLET ORAL DAILY
Qty: 30 TABLET | Refills: 5 | Status: SHIPPED | OUTPATIENT
Start: 2019-06-21 | End: 2020-02-21 | Stop reason: ALTCHOICE

## 2019-08-05 DIAGNOSIS — I10 ESSENTIAL HYPERTENSION: ICD-10-CM

## 2019-08-05 RX ORDER — LOSARTAN POTASSIUM 100 MG/1
TABLET ORAL
Qty: 30 TABLET | Refills: 5 | Status: SHIPPED | OUTPATIENT
Start: 2019-08-05 | End: 2020-02-24 | Stop reason: SDUPTHER

## 2019-10-21 ENCOUNTER — OFFICE VISIT (OUTPATIENT)
Dept: FAMILY MEDICINE CLINIC | Facility: CLINIC | Age: 69
End: 2019-10-21
Payer: MEDICARE

## 2019-10-21 VITALS
DIASTOLIC BLOOD PRESSURE: 62 MMHG | WEIGHT: 200 LBS | HEART RATE: 68 BPM | HEIGHT: 65 IN | BODY MASS INDEX: 33.32 KG/M2 | SYSTOLIC BLOOD PRESSURE: 124 MMHG

## 2019-10-21 DIAGNOSIS — H66.002 ACUTE SUPPURATIVE OTITIS MEDIA OF LEFT EAR WITHOUT SPONTANEOUS RUPTURE OF TYMPANIC MEMBRANE, RECURRENCE NOT SPECIFIED: ICD-10-CM

## 2019-10-21 DIAGNOSIS — E78.5 HYPERLIPIDEMIA, UNSPECIFIED HYPERLIPIDEMIA TYPE: Primary | ICD-10-CM

## 2019-10-21 DIAGNOSIS — R20.0 HAND NUMBNESS: ICD-10-CM

## 2019-10-21 DIAGNOSIS — I10 ESSENTIAL HYPERTENSION: ICD-10-CM

## 2019-10-21 DIAGNOSIS — E03.9 HYPOTHYROIDISM, UNSPECIFIED TYPE: ICD-10-CM

## 2019-10-21 PROCEDURE — 99214 OFFICE O/P EST MOD 30 MIN: CPT | Performed by: FAMILY MEDICINE

## 2019-10-21 RX ORDER — PROMETHAZINE HYDROCHLORIDE AND CODEINE PHOSPHATE 6.25; 1 MG/5ML; MG/5ML
5 SYRUP ORAL EVERY 4 HOURS PRN
Qty: 120 ML | Refills: 0 | Status: SHIPPED | OUTPATIENT
Start: 2019-10-21 | End: 2020-03-04 | Stop reason: CLARIF

## 2019-10-21 RX ORDER — LEVOTHYROXINE AND LIOTHYRONINE 76; 18 UG/1; UG/1
120 TABLET ORAL EVERY OTHER DAY
Qty: 30 TABLET | Refills: 5 | Status: SHIPPED | OUTPATIENT
Start: 2019-10-21 | End: 2020-02-24 | Stop reason: SDUPTHER

## 2019-10-21 RX ORDER — AZITHROMYCIN 250 MG/1
TABLET, FILM COATED ORAL
Qty: 6 TABLET | Refills: 0 | Status: SHIPPED | OUTPATIENT
Start: 2019-10-21 | End: 2019-10-25

## 2019-10-21 NOTE — PROGRESS NOTES
Assessment and Plan:    Problem List Items Addressed This Visit        Endocrine    Hypothyroidism     Thyroid  overcorrected         Relevant Medications    thyroid (ARMOUR THYROID) 120 MG tablet                 Diagnoses and all orders for this visit:    Hypothyroidism, unspecified type  -     thyroid (ARMOUR THYROID) 120 MG tablet; Take 1 tablet (120 mg total) by mouth every other day              Subjective:      Patient ID: Marisela Fitch is a 78 y o  female  CC:    Chief Complaint   Patient presents with    Follow-up     patient is here for a 4 month f/u re: chronic medical conditions  Patient c/o ongoing dry & productive cough  patient used all her cough medicine  ak        HPI:    F/u lipids, thyroid, really trying to eat healthy, having numb hands, some pulling sensation in chest  Some shortness of breath      The following portions of the patient's history were reviewed and updated as appropriate: allergies, current medications, past family history, past medical history, past social history, past surgical history and problem list         Review of Systems   Constitutional: Positive for unexpected weight change  Negative for activity change and appetite change  8 lb loss   Respiratory: Positive for cough and shortness of breath  Green mucus   Cardiovascular: Positive for chest pain and leg swelling  Feels pulling sensation from where she had surgery   Musculoskeletal: Negative for arthralgias  Neurological: Positive for weakness, numbness and headaches  Data to review:       Objective:    Vitals:    10/21/19 1404   BP: 124/62   Pulse: 68   Weight: 90 7 kg (200 lb)   Height: 5' 5" (1 651 m)        Physical Exam   Constitutional: She appears well-developed and well-nourished  HENT:   Right Ear: Tympanic membrane normal    Left Ear: Tympanic membrane is erythematous  A middle ear effusion is present  Nose: No mucosal edema or rhinorrhea     Mouth/Throat: Posterior oropharyngeal erythema present  No oropharyngeal exudate or posterior oropharyngeal edema  Neck: No thyromegaly present  Cardiovascular: Normal rate, regular rhythm and normal heart sounds  Pulmonary/Chest: Effort normal and breath sounds normal    Musculoskeletal: She exhibits edema  Trace edema   Lymphadenopathy:     She has no cervical adenopathy  Neurological: No sensory deficit  Negative Tinel's/Phalen's   Vitals reviewed

## 2019-10-21 NOTE — PATIENT INSTRUCTIONS
Take thyroid med every other day, try B6 200 mg/day for hands    Heart Healthy Diet   WHAT YOU NEED TO KNOW:   A heart healthy diet is an eating plan low in total fat, unhealthy fats, and sodium (salt)  A heart healthy diet helps decrease your risk for heart disease and stroke  Limit the amount of fat you eat to 25% to 35% of your total daily calories  Limit sodium to less than 2,300 mg each day  DISCHARGE INSTRUCTIONS:   Healthy fats:  Healthy fats can help improve cholesterol levels  The risk for heart disease is decreased when cholesterol levels are normal  Choose healthy fats, such as the following:  · Unsaturated fat  is found in foods such as soybean, canola, olive, corn, and safflower oils  It is also found in soft tub margarine that is made with liquid vegetable oil  · Omega-3 fat  is found in certain fish, such as salmon, tuna, and trout, and in walnuts and flaxseed  Unhealthy fats:  Unhealthy fats can cause unhealthy cholesterol levels in your blood and increase your risk of heart disease  Limit unhealthy fats, such as the following:  · Cholesterol  is found in animal foods, such as eggs and lobster, and in dairy products made from whole milk  Limit cholesterol to less than 300 milligrams (mg) each day  You may need to limit cholesterol to 200 mg each day if you have heart disease  · Saturated fat  is found in meats, such as liao and hamburger  It is also found in chicken or turkey skin, whole milk, and butter  Limit saturated fat to less than 7% of your total daily calories  Limit saturated fat to less than 6% if you have heart disease or are at increased risk for it  · Trans fat  is found in packaged foods, such as potato chips and cookies  It is also in hard margarine, some fried foods, and shortening  Avoid trans fats as much as possible    Heart healthy foods and drinks to include:  Ask your dietitian or healthcare provider how many servings to have from each of the following food groups:  · Grains:      ¨ Whole-wheat breads, cereals, and pastas, and brown rice    ¨ Low-fat, low-sodium crackers and chips    · Vegetables:      ¨ Broccoli, green beans, green peas, and spinach    ¨ Collards, kale, and lima beans    ¨ Carrots, sweet potatoes, tomatoes, and peppers    ¨ Canned vegetables with no salt added    · Fruits:      ¨ Bananas, peaches, pears, and pineapple    ¨ Grapes, raisins, and dates    ¨ Oranges, tangerines, grapefruit, orange juice, and grapefruit juice    ¨ Apricots, mangoes, melons, and papaya    ¨ Raspberries and strawberries    ¨ Canned fruit with no added sugar    · Low-fat dairy products:      ¨ Nonfat (skim) milk, 1% milk, and low-fat almond, cashew, or soy milks fortified with calcium    ¨ Low-fat cheese, regular or frozen yogurt, and cottage cheese    · Meats and proteins , such as lean cuts of beef and pork (loin, leg, round), skinless chicken and turkey, legumes, soy products, egg whites, and nuts  Foods and drinks to limit or avoid:  Ask your dietitian or healthcare provider about these and other foods that are high in unhealthy fat, sodium, and sugar:  · Snack or packaged foods , such as frozen dinners, cookies, macaroni and cheese, and cereals with more than 300 mg of sodium per serving    · Canned or dry mixes  for cakes, soups, sauces, or gravies    · Vegetables with added sodium , such as instant potatoes, vegetables with added sauces, or regular canned vegetables    · Other foods high in sodium , such as ketchup, barbecue sauce, salad dressing, pickles, olives, soy sauce, and miso    · High-fat dairy foods  such as whole or 2% milk, cream cheese, or sour cream, and cheeses     · High-fat protein foods  such as high-fat cuts of beef (T-bone steaks, ribs), chicken or turkey with skin, and organ meats, such as liver    · Cured or smoked meats , such as hot dogs, liao, and sausage    · Unhealthy fats and oils , such as butter, stick margarine, shortening, and cooking oils such as coconut or palm oil    · Food and drinks high in sugar , such as soft drinks (soda), sports drinks, sweetened tea, candy, cake, cookies, pies, and doughnuts  Other diet guidelines to follow:   · Eat more foods containing omega-3 fats  Eat fish high in omega-3 fats at least 2 times a week  · Limit alcohol  Too much alcohol can damage your heart and raise your blood pressure  Women should limit alcohol to 1 drink a day  Men should limit alcohol to 2 drinks a day  A drink of alcohol is 12 ounces of beer, 5 ounces of wine, or 1½ ounces of liquor  · Choose low-sodium foods  High-sodium foods can lead to high blood pressure  Add little or no salt to food you prepare  Use herbs and spices in place of salt  · Eat more fiber  to help lower cholesterol levels  Eat at least 5 servings of fruits and vegetables each day  Eat 3 ounces of whole-grain foods each day  Legumes (beans) are also a good source of fiber  Lifestyle guidelines:   · Do not smoke  Nicotine and other chemicals in cigarettes and cigars can cause lung and heart damage  Ask your healthcare provider for information if you currently smoke and need help to quit  E-cigarettes or smokeless tobacco still contain nicotine  Talk to your healthcare provider before you use these products  · Exercise regularly  to help you maintain a healthy weight and improve your blood pressure and cholesterol levels  Ask your healthcare provider about the best exercise plan for you  Do not start an exercise program without asking your healthcare provider  Follow up with your healthcare provider as directed:  Write down your questions so you remember to ask them during your visits  © 2017 [facility] Information is for End User's use only and may not be sold, redistributed or otherwise used for commercial purposes   All illustrations and images included in CareNotes® are the copyrighted property of A D A M , Inc  or [facility] [employer]  The above information is an  only  It is not intended as medical advice for individual conditions or treatments  Talk to your doctor, nurse or pharmacist before following any medical regimen to see if it is safe and effective for you

## 2019-12-23 ENCOUNTER — TELEPHONE (OUTPATIENT)
Dept: FAMILY MEDICINE CLINIC | Facility: CLINIC | Age: 69
End: 2019-12-23

## 2020-02-21 ENCOUNTER — OFFICE VISIT (OUTPATIENT)
Dept: FAMILY MEDICINE CLINIC | Facility: CLINIC | Age: 70
End: 2020-02-21
Payer: MEDICARE

## 2020-02-21 VITALS
DIASTOLIC BLOOD PRESSURE: 70 MMHG | WEIGHT: 203.6 LBS | SYSTOLIC BLOOD PRESSURE: 110 MMHG | HEART RATE: 88 BPM | HEIGHT: 65 IN | RESPIRATION RATE: 18 BRPM | TEMPERATURE: 98.2 F | BODY MASS INDEX: 33.92 KG/M2

## 2020-02-21 DIAGNOSIS — I10 ESSENTIAL HYPERTENSION: ICD-10-CM

## 2020-02-21 DIAGNOSIS — J18.9 PNEUMONIA OF BOTH LOWER LOBES DUE TO INFECTIOUS ORGANISM: Primary | ICD-10-CM

## 2020-02-21 DIAGNOSIS — C50.812 MALIGNANT NEOPLASM OF OVERLAPPING SITES OF LEFT BREAST IN FEMALE, ESTROGEN RECEPTOR POSITIVE (HCC): ICD-10-CM

## 2020-02-21 DIAGNOSIS — R10.12 LEFT UPPER QUADRANT PAIN: ICD-10-CM

## 2020-02-21 DIAGNOSIS — E03.9 HYPOTHYROIDISM, UNSPECIFIED TYPE: ICD-10-CM

## 2020-02-21 DIAGNOSIS — Z17.0 MALIGNANT NEOPLASM OF OVERLAPPING SITES OF LEFT BREAST IN FEMALE, ESTROGEN RECEPTOR POSITIVE (HCC): ICD-10-CM

## 2020-02-21 DIAGNOSIS — E78.5 HYPERLIPIDEMIA, UNSPECIFIED HYPERLIPIDEMIA TYPE: ICD-10-CM

## 2020-02-21 PROCEDURE — 4040F PNEUMOC VAC/ADMIN/RCVD: CPT | Performed by: FAMILY MEDICINE

## 2020-02-21 PROCEDURE — 1160F RVW MEDS BY RX/DR IN RCRD: CPT | Performed by: FAMILY MEDICINE

## 2020-02-21 PROCEDURE — 99214 OFFICE O/P EST MOD 30 MIN: CPT | Performed by: FAMILY MEDICINE

## 2020-02-21 PROCEDURE — 3008F BODY MASS INDEX DOCD: CPT | Performed by: FAMILY MEDICINE

## 2020-02-21 PROCEDURE — 3074F SYST BP LT 130 MM HG: CPT | Performed by: FAMILY MEDICINE

## 2020-02-21 PROCEDURE — 3078F DIAST BP <80 MM HG: CPT | Performed by: FAMILY MEDICINE

## 2020-02-21 PROCEDURE — 1036F TOBACCO NON-USER: CPT | Performed by: FAMILY MEDICINE

## 2020-02-21 RX ORDER — CEFUROXIME AXETIL 500 MG/1
500 TABLET ORAL EVERY 12 HOURS SCHEDULED
Qty: 20 TABLET | Refills: 0 | Status: SHIPPED | OUTPATIENT
Start: 2020-02-21 | End: 2020-03-02

## 2020-02-21 RX ORDER — GUAIFENESIN AND CODEINE PHOSPHATE 100; 10 MG/5ML; MG/5ML
5 SOLUTION ORAL 3 TIMES DAILY PRN
Qty: 120 ML | Refills: 0 | Status: SHIPPED | OUTPATIENT
Start: 2020-02-21 | End: 2020-03-04 | Stop reason: CLARIF

## 2020-02-21 NOTE — TELEPHONE ENCOUNTER
Pt called requesting refill of Losartan and Chappell Thyroid, states they forgot to tell MF during ov

## 2020-02-21 NOTE — PROGRESS NOTES
Assessment and Plan:    Problem List Items Addressed This Visit     None      Visit Diagnoses     Cough    -  Primary                 Diagnoses and all orders for this visit:    Cough              Subjective:      Patient ID: Dionicio Chávez is a 78 y o  female  CC:    Chief Complaint   Patient presents with    Follow-up     4 month        HPI:    Here for f/u chronic health conditions, cough productive of yellow mucus with congestion and nosebleeds      The following portions of the patient's history were reviewed and updated as appropriate: allergies, current medications, past family history, past medical history, past social history, past surgical history and problem list         Review of Systems   Constitutional: Negative for activity change, appetite change and unexpected weight change  HENT: Positive for congestion, nosebleeds, sinus pressure and sinus pain  Respiratory: Positive for cough  Gastrointestinal: Positive for abdominal pain  Neurological: Positive for headaches  Negative for dizziness  Data to review:       Objective:    Vitals:    02/21/20 1410   BP: 110/70   BP Location: Right arm   Patient Position: Sitting   Cuff Size: Adult   Pulse: 88   Resp: 18   Temp: 98 2 °F (36 8 °C)   TempSrc: Temporal   Weight: 92 4 kg (203 lb 9 6 oz)   Height: 5' 5" (1 651 m)        Physical Exam   Constitutional: She appears well-developed and well-nourished  Neck: No thyromegaly present  Cardiovascular: Normal rate and regular rhythm  Pulmonary/Chest: Effort normal  She has rales in the right lower field and the left lower field  Abdominal: Soft  Bowel sounds are normal  There is tenderness in the left upper quadrant  Lymphadenopathy:     She has no cervical adenopathy  Vitals reviewed

## 2020-02-21 NOTE — PATIENT INSTRUCTIONS
In future if mucus is clear rec zyrtec or claritin, call me in future if discolored mucus, pt should be taking ezetimide for cholesterol

## 2020-02-24 ENCOUNTER — TELEPHONE (OUTPATIENT)
Dept: FAMILY MEDICINE CLINIC | Facility: CLINIC | Age: 70
End: 2020-02-24

## 2020-02-24 RX ORDER — LOSARTAN POTASSIUM 100 MG/1
100 TABLET ORAL DAILY
Qty: 30 TABLET | Refills: 5 | Status: SHIPPED | OUTPATIENT
Start: 2020-02-24 | End: 2020-08-31

## 2020-02-24 RX ORDER — LEVOTHYROXINE AND LIOTHYRONINE 76; 18 UG/1; UG/1
120 TABLET ORAL EVERY OTHER DAY
Qty: 30 TABLET | Refills: 5 | Status: SHIPPED | OUTPATIENT
Start: 2020-02-24 | End: 2020-05-05 | Stop reason: SDUPTHER

## 2020-02-25 ENCOUNTER — TELEPHONE (OUTPATIENT)
Dept: FAMILY MEDICINE CLINIC | Facility: CLINIC | Age: 70
End: 2020-02-25

## 2020-02-25 NOTE — TELEPHONE ENCOUNTER
Patient called stating she needed a kidney and liver function test (CMP) to complete her CT scan tomorrow scheduled at [facility]  Patient had labs completed at [facility] labs in [facility] per Juan M Richards  The phone number for results are [phone]  The lab location phone number is [phone]

## 2020-02-27 DIAGNOSIS — R06.00 DYSPNEA, UNSPECIFIED TYPE: ICD-10-CM

## 2020-02-27 DIAGNOSIS — E78.5 HYPERLIPIDEMIA, UNSPECIFIED HYPERLIPIDEMIA TYPE: Primary | ICD-10-CM

## 2020-02-27 DIAGNOSIS — I10 ESSENTIAL HYPERTENSION: ICD-10-CM

## 2020-03-04 ENCOUNTER — OFFICE VISIT (OUTPATIENT)
Dept: FAMILY MEDICINE CLINIC | Facility: CLINIC | Age: 70
End: 2020-03-04
Payer: MEDICARE

## 2020-03-04 VITALS
WEIGHT: 204.2 LBS | BODY MASS INDEX: 34.02 KG/M2 | HEIGHT: 65 IN | DIASTOLIC BLOOD PRESSURE: 68 MMHG | SYSTOLIC BLOOD PRESSURE: 124 MMHG | TEMPERATURE: 98.5 F | HEART RATE: 76 BPM

## 2020-03-04 DIAGNOSIS — E78.5 HYPERLIPIDEMIA, UNSPECIFIED HYPERLIPIDEMIA TYPE: ICD-10-CM

## 2020-03-04 DIAGNOSIS — J45.30 MILD PERSISTENT ASTHMA WITHOUT COMPLICATION: Primary | ICD-10-CM

## 2020-03-04 DIAGNOSIS — E03.9 HYPOTHYROIDISM, UNSPECIFIED TYPE: ICD-10-CM

## 2020-03-04 PROCEDURE — 1036F TOBACCO NON-USER: CPT | Performed by: FAMILY MEDICINE

## 2020-03-04 PROCEDURE — 3008F BODY MASS INDEX DOCD: CPT | Performed by: FAMILY MEDICINE

## 2020-03-04 PROCEDURE — 4040F PNEUMOC VAC/ADMIN/RCVD: CPT | Performed by: FAMILY MEDICINE

## 2020-03-04 PROCEDURE — 3074F SYST BP LT 130 MM HG: CPT | Performed by: FAMILY MEDICINE

## 2020-03-04 PROCEDURE — 3078F DIAST BP <80 MM HG: CPT | Performed by: FAMILY MEDICINE

## 2020-03-04 PROCEDURE — 99214 OFFICE O/P EST MOD 30 MIN: CPT | Performed by: FAMILY MEDICINE

## 2020-03-04 PROCEDURE — 1160F RVW MEDS BY RX/DR IN RCRD: CPT | Performed by: FAMILY MEDICINE

## 2020-03-04 RX ORDER — MONTELUKAST SODIUM 10 MG/1
10 TABLET ORAL
Qty: 30 TABLET | Refills: 5 | Status: SHIPPED | OUTPATIENT
Start: 2020-03-04

## 2020-03-04 NOTE — PROGRESS NOTES
Assessment/Plan:    Hypothyroidism  TSH all over the place, takes same time, same way, no change in pills, stop kelp because could be throwing thyroid off    Hyperlipidemia  Hasn't been diligent in taking fish oil-had side effects but wasn't keeping in refrigerator       Diagnoses and all orders for this visit:    Mild persistent asthma without complication  -     Complete PFT with post bronchodilator; Future    Hyperlipidemia, unspecified hyperlipidemia type    Hypothyroidism, unspecified type  -     TSH, 3rd generation; Future  -     T4, free; Future          Subjective:      Patient ID: Marisela Fitch is a 78 y o  female  Here for f/u thyroid and CT scan, hasn't had PFTS in a while for asthma, still complaining of l side pain and feels weak and tired since CT scan      The following portions of the patient's history were reviewed and updated as appropriate: allergies, current medications, past family history, past medical history, past social history, past surgical history and problem list     Review of Systems   Constitutional: Positive for fatigue  Negative for activity change, appetite change and unexpected weight change  Respiratory: Positive for cough and wheezing  Negative for shortness of breath  Cardiovascular: Negative for chest pain  Gastrointestinal: Positive for abdominal pain  Neurological: Positive for weakness  Negative for dizziness and headaches  Objective:      /68 (BP Location: Right arm, Patient Position: Sitting, Cuff Size: Large)   Pulse 76   Temp 98 5 °F (36 9 °C) (Tympanic)   Ht 5' 5" (1 651 m)   Wt 92 6 kg (204 lb 3 2 oz)   BMI 33 98 kg/m²          Physical Exam   Constitutional: She appears well-developed and well-nourished  Neck: No thyromegaly present  Cardiovascular: Normal rate, regular rhythm and normal heart sounds  Pulmonary/Chest: Effort normal  She has wheezes in the right lower field and the left lower field     Musculoskeletal: She exhibits no edema  Lymphadenopathy:     She has no cervical adenopathy  Vitals reviewed

## 2020-03-04 NOTE — ASSESSMENT & PLAN NOTE
TSH all over the place, takes same time, same way, no change in pills, stop kelp because could be throwing thyroid off

## 2020-03-04 NOTE — PATIENT INSTRUCTIONS
rec restart Zetia, keep fish oil in refrigerator, stop kelp because that can be throwing thyroid off, add Singulair for asthma

## 2020-03-11 ENCOUNTER — TELEPHONE (OUTPATIENT)
Dept: ADMINISTRATIVE | Facility: OTHER | Age: 70
End: 2020-03-11

## 2020-03-11 NOTE — TELEPHONE ENCOUNTER
----- Message from Easton Garcia sent at 3/11/2020  4:02 PM EDT -----  Regarding: mammogram  03/11/20 4:03 PM    Hello, our patient Mayelin Joe has had Mammogram completed/performed  Please assist in updating the patient chart by pulling the Care Everywhere (CE) document  The date of service is 5/10/2019       Thank you,  Jessica Blackwell  [facility]

## 2020-03-11 NOTE — TELEPHONE ENCOUNTER
Upon review of the In Basket request we were able to locate, review, and update the patient chart as requested for Mammogram     Any additional questions or concerns should be emailed to the Practice Liaisons via [email or web address] email, please do not reply via In Basket      Thank you  Cortes Phillips

## 2020-04-14 DIAGNOSIS — E03.9 HYPOTHYROIDISM, UNSPECIFIED TYPE: Primary | ICD-10-CM

## 2020-05-05 ENCOUNTER — TELEPHONE (OUTPATIENT)
Dept: FAMILY MEDICINE CLINIC | Facility: CLINIC | Age: 70
End: 2020-05-05

## 2020-05-05 DIAGNOSIS — E03.9 HYPOTHYROIDISM, UNSPECIFIED TYPE: Primary | ICD-10-CM

## 2020-05-05 DIAGNOSIS — E03.9 HYPOTHYROIDISM, UNSPECIFIED TYPE: ICD-10-CM

## 2020-05-05 RX ORDER — LEVOTHYROXINE AND LIOTHYRONINE 76; 18 UG/1; UG/1
TABLET ORAL
Qty: 30 TABLET | Refills: 5 | Status: SHIPPED | OUTPATIENT
Start: 2020-05-05 | End: 2020-05-05 | Stop reason: DRUGHIGH

## 2020-05-05 RX ORDER — LEVOTHYROXINE AND LIOTHYRONINE 57; 13.5 UG/1; UG/1
90 TABLET ORAL DAILY
Qty: 30 TABLET | Refills: 5 | Status: SHIPPED | OUTPATIENT
Start: 2020-05-05 | End: 2020-06-15 | Stop reason: DRUGHIGH

## 2020-06-15 ENCOUNTER — OFFICE VISIT (OUTPATIENT)
Dept: FAMILY MEDICINE CLINIC | Facility: CLINIC | Age: 70
End: 2020-06-15
Payer: MEDICARE

## 2020-06-15 VITALS
WEIGHT: 206 LBS | HEIGHT: 65 IN | RESPIRATION RATE: 18 BRPM | BODY MASS INDEX: 34.32 KG/M2 | DIASTOLIC BLOOD PRESSURE: 68 MMHG | SYSTOLIC BLOOD PRESSURE: 126 MMHG | HEART RATE: 78 BPM | TEMPERATURE: 97.7 F

## 2020-06-15 DIAGNOSIS — I10 ESSENTIAL HYPERTENSION: ICD-10-CM

## 2020-06-15 DIAGNOSIS — E03.9 HYPOTHYROIDISM, UNSPECIFIED TYPE: ICD-10-CM

## 2020-06-15 DIAGNOSIS — Z12.31 ENCOUNTER FOR SCREENING MAMMOGRAM FOR BREAST CANCER: Primary | ICD-10-CM

## 2020-06-15 DIAGNOSIS — J45.30 MILD PERSISTENT ASTHMA WITHOUT COMPLICATION: ICD-10-CM

## 2020-06-15 DIAGNOSIS — J42 CHRONIC BRONCHITIS, UNSPECIFIED CHRONIC BRONCHITIS TYPE (HCC): ICD-10-CM

## 2020-06-15 PROCEDURE — 99214 OFFICE O/P EST MOD 30 MIN: CPT | Performed by: FAMILY MEDICINE

## 2020-06-15 PROCEDURE — 1160F RVW MEDS BY RX/DR IN RCRD: CPT | Performed by: FAMILY MEDICINE

## 2020-06-15 PROCEDURE — 3078F DIAST BP <80 MM HG: CPT | Performed by: FAMILY MEDICINE

## 2020-06-15 PROCEDURE — 3074F SYST BP LT 130 MM HG: CPT | Performed by: FAMILY MEDICINE

## 2020-06-15 PROCEDURE — 1036F TOBACCO NON-USER: CPT | Performed by: FAMILY MEDICINE

## 2020-06-15 PROCEDURE — 3008F BODY MASS INDEX DOCD: CPT | Performed by: FAMILY MEDICINE

## 2020-06-15 PROCEDURE — 4040F PNEUMOC VAC/ADMIN/RCVD: CPT | Performed by: FAMILY MEDICINE

## 2020-06-15 RX ORDER — LEVOTHYROXINE AND LIOTHYRONINE 76; 18 UG/1; UG/1
120 TABLET ORAL DAILY
Qty: 90 TABLET | Refills: 1 | Status: SHIPPED | OUTPATIENT
Start: 2020-06-15

## 2020-06-15 RX ORDER — ALBUTEROL SULFATE 90 UG/1
2 AEROSOL, METERED RESPIRATORY (INHALATION) EVERY 6 HOURS PRN
COMMUNITY
Start: 2020-05-08 | End: 2021-05-08

## 2020-08-31 DIAGNOSIS — I10 ESSENTIAL HYPERTENSION: ICD-10-CM

## 2020-08-31 RX ORDER — LOSARTAN POTASSIUM 100 MG/1
TABLET ORAL
Qty: 30 TABLET | Refills: 5 | Status: SHIPPED | OUTPATIENT
Start: 2020-08-31

## 2021-05-20 ENCOUNTER — TELEPHONE (OUTPATIENT)
Dept: FAMILY MEDICINE CLINIC | Facility: CLINIC | Age: 71
End: 2021-05-20

## 2021-05-20 NOTE — TELEPHONE ENCOUNTER
ON 7/7/2020 patient established care with Jane Mooney  [phone], [address] [facility]   [address] ([place] [address]

## 2021-05-24 ENCOUNTER — TELEPHONE (OUTPATIENT)
Dept: ADMINISTRATIVE | Facility: OTHER | Age: 71
End: 2021-05-24

## 2021-05-24 NOTE — TELEPHONE ENCOUNTER
----- Message from Faye Preston sent at 5/22/2021  8:55 AM EDT -----  Regarding: Care Gap request/ [facility]  05/22/21 8:55 AM    Hello, our patient Evans Esparza has had Mammogram completed/performed  Please assist in updating the patient chart by pulling the Care Everywhere (CE) document  The date of service is 05/21/2021       Thank you,  Faye [facility]

## 2021-05-28 NOTE — TELEPHONE ENCOUNTER
Upon review of the In Basket request we were able to locate, review, and update the patient chart as requested for Mammogram     Any additional questions or concerns should be emailed to the Practice Liaisons via [email or web address] email, please do not reply via In Basket      Thank you  Flori Guajardo

## 2021-08-26 NOTE — TELEPHONE ENCOUNTER
Called and spoke w/ pharmacist and they were able to run it through again and insurance picked it up  They have her instructed to take 1 tab 3 times a day  Do you want us to inform pt to take it twice a day as you wrote  pls advise  0 = swallows foods/liquids without difficulty

## 2023-10-09 ENCOUNTER — APPOINTMENT (EMERGENCY)
Dept: CT IMAGING | Facility: HOSPITAL | Age: 73
End: 2023-10-09
Payer: COMMERCIAL

## 2023-10-09 ENCOUNTER — APPOINTMENT (EMERGENCY)
Dept: RADIOLOGY | Facility: HOSPITAL | Age: 73
End: 2023-10-09
Payer: COMMERCIAL

## 2023-10-09 ENCOUNTER — HOSPITAL ENCOUNTER (EMERGENCY)
Facility: HOSPITAL | Age: 73
Discharge: HOME | End: 2023-10-09
Attending: EMERGENCY MEDICINE
Payer: COMMERCIAL

## 2023-10-09 VITALS
SYSTOLIC BLOOD PRESSURE: 134 MMHG | OXYGEN SATURATION: 92 % | HEIGHT: 65 IN | WEIGHT: 212 LBS | DIASTOLIC BLOOD PRESSURE: 65 MMHG | BODY MASS INDEX: 35.32 KG/M2 | RESPIRATION RATE: 20 BRPM | HEART RATE: 95 BPM | TEMPERATURE: 98.8 F

## 2023-10-09 DIAGNOSIS — N39.0 UTI (URINARY TRACT INFECTION): ICD-10-CM

## 2023-10-09 DIAGNOSIS — K29.50 CHRONIC GASTRITIS WITHOUT BLEEDING, UNSPECIFIED GASTRITIS TYPE: Primary | ICD-10-CM

## 2023-10-09 LAB
ALBUMIN SERPL BCP-MCNC: 4.3 G/DL (ref 3.5–5)
ALP SERPL-CCNC: 62 U/L (ref 34–104)
ALT SERPL W P-5'-P-CCNC: 17 U/L (ref 7–52)
ANION GAP SERPL CALCULATED.3IONS-SCNC: 9 MMOL/L
APTT PPP: 24 SECONDS (ref 23–37)
AST SERPL W P-5'-P-CCNC: 20 U/L (ref 13–39)
ATRIAL RATE: 97 BPM
BACTERIA UR QL AUTO: ABNORMAL /HPF
BASOPHILS # BLD AUTO: 0.04 THOUSANDS/ÂΜL (ref 0–0.1)
BASOPHILS NFR BLD AUTO: 0 % (ref 0–1)
BILIRUB SERPL-MCNC: 0.59 MG/DL (ref 0.2–1)
BILIRUB UR QL STRIP: NEGATIVE
BUN SERPL-MCNC: 18 MG/DL (ref 5–25)
CALCIUM SERPL-MCNC: 10.2 MG/DL (ref 8.4–10.2)
CHLORIDE SERPL-SCNC: 99 MMOL/L (ref 96–108)
CLARITY UR: ABNORMAL
CO2 SERPL-SCNC: 22 MMOL/L (ref 21–32)
COLOR UR: YELLOW
CREAT SERPL-MCNC: 0.93 MG/DL (ref 0.6–1.3)
EOSINOPHIL # BLD AUTO: 0.01 THOUSAND/ÂΜL (ref 0–0.61)
EOSINOPHIL NFR BLD AUTO: 0 % (ref 0–6)
ERYTHROCYTE [DISTWIDTH] IN BLOOD BY AUTOMATED COUNT: 12.7 % (ref 11.6–15.1)
FLUAV RNA RESP QL NAA+PROBE: NEGATIVE
FLUBV RNA RESP QL NAA+PROBE: NEGATIVE
GFR SERPL CREATININE-BSD FRML MDRD: 60 ML/MIN/1.73SQ M
GLUCOSE SERPL-MCNC: 121 MG/DL (ref 65–140)
GLUCOSE UR STRIP-MCNC: NEGATIVE MG/DL
HCT VFR BLD AUTO: 39 % (ref 34.8–46.1)
HGB BLD-MCNC: 13.3 G/DL (ref 11.5–15.4)
HGB UR QL STRIP.AUTO: ABNORMAL
IMM GRANULOCYTES # BLD AUTO: 0.09 THOUSAND/UL (ref 0–0.2)
IMM GRANULOCYTES NFR BLD AUTO: 1 % (ref 0–2)
INR PPP: 1 (ref 0.84–1.19)
KETONES UR STRIP-MCNC: NEGATIVE MG/DL
LACTATE SERPL-SCNC: 1.7 MMOL/L (ref 0.5–2)
LEUKOCYTE ESTERASE UR QL STRIP: ABNORMAL
LYMPHOCYTES # BLD AUTO: 0.93 THOUSANDS/ÂΜL (ref 0.6–4.47)
LYMPHOCYTES NFR BLD AUTO: 7 % (ref 14–44)
MCH RBC QN AUTO: 30.3 PG (ref 26.8–34.3)
MCHC RBC AUTO-ENTMCNC: 34.1 G/DL (ref 31.4–37.4)
MCV RBC AUTO: 89 FL (ref 82–98)
MONOCYTES # BLD AUTO: 1.15 THOUSAND/ÂΜL (ref 0.17–1.22)
MONOCYTES NFR BLD AUTO: 9 % (ref 4–12)
NEUTROPHILS # BLD AUTO: 11.05 THOUSANDS/ÂΜL (ref 1.85–7.62)
NEUTS SEG NFR BLD AUTO: 83 % (ref 43–75)
NITRITE UR QL STRIP: NEGATIVE
NON-SQ EPI CELLS URNS QL MICRO: ABNORMAL /HPF
NRBC BLD AUTO-RTO: 0 /100 WBCS
P AXIS: 35 DEGREES
PH UR STRIP.AUTO: 5.5 [PH]
PLATELET # BLD AUTO: 161 THOUSANDS/UL (ref 149–390)
PMV BLD AUTO: 9.7 FL (ref 8.9–12.7)
POTASSIUM SERPL-SCNC: 4 MMOL/L (ref 3.5–5.3)
PR INTERVAL: 188 MS
PROCALCITONIN SERPL-MCNC: 0.58 NG/ML
PROT SERPL-MCNC: 7.7 G/DL (ref 6.4–8.4)
PROT UR STRIP-MCNC: ABNORMAL MG/DL
PROTHROMBIN TIME: 13.5 SECONDS (ref 11.6–14.5)
QRS AXIS: 7 DEGREES
QRSD INTERVAL: 90 MS
QT INTERVAL: 334 MS
QTC INTERVAL: 424 MS
RBC # BLD AUTO: 4.39 MILLION/UL (ref 3.81–5.12)
RBC #/AREA URNS AUTO: ABNORMAL /HPF
RSV RNA RESP QL NAA+PROBE: NEGATIVE
SARS-COV-2 RNA RESP QL NAA+PROBE: NEGATIVE
SODIUM SERPL-SCNC: 130 MMOL/L (ref 135–147)
SP GR UR STRIP.AUTO: 1.02 (ref 1–1.03)
T WAVE AXIS: 67 DEGREES
TSH SERPL DL<=0.05 MIU/L-ACNC: 0.48 UIU/ML (ref 0.45–4.5)
UROBILINOGEN UR QL STRIP.AUTO: 0.2 E.U./DL
VENTRICULAR RATE: 97 BPM
WBC # BLD AUTO: 13.27 THOUSAND/UL (ref 4.31–10.16)
WBC #/AREA URNS AUTO: ABNORMAL /HPF

## 2023-10-09 PROCEDURE — 85025 COMPLETE CBC W/AUTO DIFF WBC: CPT

## 2023-10-09 PROCEDURE — 74177 CT ABD & PELVIS W/CONTRAST: CPT

## 2023-10-09 PROCEDURE — 96361 HYDRATE IV INFUSION ADD-ON: CPT

## 2023-10-09 PROCEDURE — 87181 SC STD AGAR DILUTION PER AGT: CPT

## 2023-10-09 PROCEDURE — 81001 URINALYSIS AUTO W/SCOPE: CPT

## 2023-10-09 PROCEDURE — 71046 X-RAY EXAM CHEST 2 VIEWS: CPT

## 2023-10-09 PROCEDURE — G1004 CDSM NDSC: HCPCS

## 2023-10-09 PROCEDURE — 85730 THROMBOPLASTIN TIME PARTIAL: CPT

## 2023-10-09 PROCEDURE — 36415 COLL VENOUS BLD VENIPUNCTURE: CPT

## 2023-10-09 PROCEDURE — 87147 CULTURE TYPE IMMUNOLOGIC: CPT

## 2023-10-09 PROCEDURE — 87086 URINE CULTURE/COLONY COUNT: CPT

## 2023-10-09 PROCEDURE — 96375 TX/PRO/DX INJ NEW DRUG ADDON: CPT

## 2023-10-09 PROCEDURE — 96365 THER/PROPH/DIAG IV INF INIT: CPT

## 2023-10-09 PROCEDURE — 99285 EMERGENCY DEPT VISIT HI MDM: CPT | Performed by: EMERGENCY MEDICINE

## 2023-10-09 PROCEDURE — 83605 ASSAY OF LACTIC ACID: CPT

## 2023-10-09 PROCEDURE — 87154 CUL TYP ID BLD PTHGN 6+ TRGT: CPT

## 2023-10-09 PROCEDURE — 93005 ELECTROCARDIOGRAM TRACING: CPT

## 2023-10-09 PROCEDURE — 84443 ASSAY THYROID STIM HORMONE: CPT | Performed by: EMERGENCY MEDICINE

## 2023-10-09 PROCEDURE — 85610 PROTHROMBIN TIME: CPT

## 2023-10-09 PROCEDURE — 87040 BLOOD CULTURE FOR BACTERIA: CPT

## 2023-10-09 PROCEDURE — 80053 COMPREHEN METABOLIC PANEL: CPT

## 2023-10-09 PROCEDURE — 99284 EMERGENCY DEPT VISIT MOD MDM: CPT

## 2023-10-09 PROCEDURE — 84145 PROCALCITONIN (PCT): CPT

## 2023-10-09 PROCEDURE — 0241U HB NFCT DS VIR RESP RNA 4 TRGT: CPT | Performed by: EMERGENCY MEDICINE

## 2023-10-09 RX ORDER — ACETAMINOPHEN 325 MG/1
650 TABLET ORAL ONCE
Status: COMPLETED | OUTPATIENT
Start: 2023-10-09 | End: 2023-10-09

## 2023-10-09 RX ORDER — FAMOTIDINE 20 MG/1
20 TABLET, FILM COATED ORAL 2 TIMES DAILY
Qty: 30 TABLET | Refills: 0 | Status: SHIPPED | OUTPATIENT
Start: 2023-10-09

## 2023-10-09 RX ORDER — CEFTRIAXONE 1 G/50ML
1000 INJECTION, SOLUTION INTRAVENOUS ONCE
Status: COMPLETED | OUTPATIENT
Start: 2023-10-09 | End: 2023-10-09

## 2023-10-09 RX ORDER — FAMOTIDINE 10 MG/ML
20 INJECTION, SOLUTION INTRAVENOUS ONCE
Status: COMPLETED | OUTPATIENT
Start: 2023-10-09 | End: 2023-10-09

## 2023-10-09 RX ORDER — CEPHALEXIN 500 MG/1
500 CAPSULE ORAL EVERY 12 HOURS SCHEDULED
Qty: 20 CAPSULE | Refills: 0 | Status: SHIPPED | OUTPATIENT
Start: 2023-10-09 | End: 2023-10-19

## 2023-10-09 RX ORDER — SODIUM CHLORIDE 9 MG/ML
250 INJECTION, SOLUTION INTRAVENOUS CONTINUOUS
Status: DISCONTINUED | OUTPATIENT
Start: 2023-10-09 | End: 2023-10-09 | Stop reason: HOSPADM

## 2023-10-09 RX ADMIN — SODIUM CHLORIDE 1000 ML: 0.9 INJECTION, SOLUTION INTRAVENOUS at 01:11

## 2023-10-09 RX ADMIN — IOHEXOL 100 ML: 350 INJECTION, SOLUTION INTRAVENOUS at 02:59

## 2023-10-09 RX ADMIN — ACETAMINOPHEN 650 MG: 325 TABLET, FILM COATED ORAL at 01:14

## 2023-10-09 RX ADMIN — SODIUM CHLORIDE 250 ML/HR: 0.9 INJECTION, SOLUTION INTRAVENOUS at 02:17

## 2023-10-09 RX ADMIN — CEFTRIAXONE 1000 MG: 1 INJECTION, SOLUTION INTRAVENOUS at 01:14

## 2023-10-09 RX ADMIN — FAMOTIDINE 20 MG: 10 INJECTION, SOLUTION INTRAVENOUS at 03:14

## 2023-10-09 NOTE — ED PROVIDER NOTES
History  Chief Complaint   Patient presents with   • Abdominal Pain     Pt c/o generalized abdominal pain and vomiting x2 days. Patient is a 69-year-old female presenting to the emergency department complaining of generalized abdominal pain with nausea and vomiting for the past 2 days, she has had cough and congestion, generalized body aches and fevers, no known sick contacts, reports she was recently evaluated by PCP and work-up was unremarkable but symptoms persist          Prior to Admission Medications   Prescriptions Last Dose Informant Patient Reported? Taking?    Cholecalciferol 73250 units TABS  Self Yes No   Sig: Take 1 capsule by mouth   Magnesium Gluconate 550 MG TABS  Self Yes No   Sig: Take 30 mg by mouth   Omega-3 Fatty Acids (FISH OIL PO)  Self Yes No   Sig: Take 2 g by mouth   PROAIR  (90 Base) MCG/ACT inhaler   Yes No   exemestane (AROMASIN) 25 MG tablet  Self No No   Sig: Take 1 tablet (25 mg total) by mouth daily   ezetimibe (ZETIA) 10 mg tablet  Self No No   Sig: Take 1 tablet (10 mg total) by mouth daily   Patient not taking: Reported on 10/21/2019   losartan (COZAAR) 100 MG tablet   No No   Sig: TAKE 1 TABLET (100 MG) BY MOUTH DAILY   montelukast (SINGULAIR) 10 mg tablet   No No   Sig: Take 1 tablet (10 mg total) by mouth daily at bedtime   multivitamin (THERAGRAN) TABS  Self Yes No   Sig: Take 1 tablet by mouth   thyroid (ARMOUR) 120 MG tablet   No No   Sig: Take 1 tablet (120 mg total) by mouth daily   vitamin E 100 UNIT capsule  Self Yes No   Sig: Take 100 Units by mouth      Facility-Administered Medications: None       Past Medical History:   Diagnosis Date   • Anxiety    • Asthma    • Cancer ([address] )    • Depression    • Disease of thyroid gland    • Hyperlipemia    • Hypertension    • OAB (overactive bladder)        Past Surgical History:   Procedure Laterality Date   • BLADDER SURGERY  2011   • BREAST LUMPECTOMY  2013       Family History   Problem Relation Age of Onset   • Tuberculosis Mother    • Cancer Father         prostate   • Cancer Sister         uterine   • Cancer Paternal Aunt         pancreatic   • Cancer Cousin         pancreatic   • Cancer Brother 46        renata     I have reviewed and agree with the history as documented. E-Cigarette/Vaping     E-Cigarette/Vaping Substances     Social History     Tobacco Use   • Smoking status: Never   • Smokeless tobacco: Never   Substance Use Topics   • Alcohol use: No   • Drug use: No       Review of Systems   Constitutional: Positive for chills, fatigue and fever. HENT: Positive for congestion. Eyes: Negative. Respiratory: Positive for cough. Cardiovascular: Negative. Gastrointestinal: Positive for abdominal pain, nausea and vomiting. Endocrine: Negative. Genitourinary: Negative. Musculoskeletal: Negative. Skin: Negative. Allergic/Immunologic: Negative. Neurological: Negative. Hematological: Negative. Psychiatric/Behavioral: Negative. Physical Exam  Physical Exam  Constitutional:       Appearance: She is well-developed. She is ill-appearing. HENT:      Head: Normocephalic and atraumatic. Eyes:      Conjunctiva/sclera: Conjunctivae normal.      Pupils: Pupils are equal, round, and reactive to light. Cardiovascular:      Rate and Rhythm: Normal rate. Pulmonary:      Effort: Pulmonary effort is normal.      Breath sounds: Examination of the left-middle field reveals rhonchi. Rhonchi present. Abdominal:      Palpations: Abdomen is soft. Tenderness: There is generalized abdominal tenderness. Musculoskeletal:         General: Normal range of motion. Cervical back: Normal range of motion and neck supple. Skin:     General: Skin is warm and dry. Neurological:      Mental Status: She is alert and oriented to person, place, and time.          Vital Signs  ED Triage Vitals   Temperature Pulse Respirations Blood Pressure SpO2   10/09/23 0032 10/09/23 0032 10/09/23 0032 10/09/23 0032 10/09/23 0032   (!) 100.9 °F (38.3 °C) (!) 106 20 (!) 198/78 95 %      Temp Source Heart Rate Source Patient Position - Orthostatic VS BP Location FiO2 (%)   10/09/23 0032 10/09/23 0032 10/09/23 0032 10/09/23 0032 --   Oral Monitor Sitting Left arm       Pain Score       10/09/23 0114       Med Not Given for Pain - for MAR use only           Vitals:    10/09/23 0200 10/09/23 0215 10/09/23 0230 10/09/23 0330   BP: 142/70 142/63 146/66 134/65   Pulse: 87 102 85 95   Patient Position - Orthostatic VS:             Visual Acuity      ED Medications  Medications   sodium chloride 0.9 % infusion (0 mL/hr Intravenous Stopped 10/9/23 0352)   sodium chloride 0.9 % bolus 1,000 mL (0 mL Intravenous Stopped 10/9/23 0216)   acetaminophen (TYLENOL) tablet 650 mg (650 mg Oral Given 10/9/23 0114)   cefTRIAXone (ROCEPHIN) IVPB (premix in dextrose) 1,000 mg 50 mL (0 mg Intravenous Stopped 10/9/23 0140)   iohexol (OMNIPAQUE) 350 MG/ML injection (SINGLE-DOSE) 100 mL (100 mL Intravenous Given 10/9/23 0259)   Famotidine (PF) (PEPCID) injection 20 mg (20 mg Intravenous Given 10/9/23 0314)       Diagnostic Studies  Results Reviewed     Procedure Component Value Units Date/Time    FLU/RSV/COVID - if FLU/RSV clinically relevant [[id number]]  (Normal) Collected: 10/09/23 0100    Lab Status: Final result Specimen: Nares from Nose Updated: 10/09/23 0329     SARS-CoV-2 Negative     INFLUENZA A PCR Negative     INFLUENZA B PCR Negative     RSV PCR Negative    Narrative:      FOR PEDIATRIC PATIENTS - copy/paste COVID Guidelines URL to browser: [email or web address]    SARS-CoV-2 assay is a Nucleic Acid Amplification assay intended for the  qualitative detection of nucleic acid from SARS-CoV-2 in nasopharyngeal  swabs. Results are for the presumptive identification of SARS-CoV-2 RNA.     Positive results are indicative of infection with SARS-CoV-2, the virus  causing COVID-19, but do not rule out bacterial infection or co-infection  with other viruses. Laboratories within the [facility] and its  territories are required to report all positive results to the appropriate  public health authorities. Negative results do not preclude SARS-CoV-2  infection and should not be used as the sole basis for treatment or other  patient management decisions. Negative results must be combined with  clinical observations, patient history, and epidemiological information. This test has not been FDA cleared or approved. This test has been authorized by FDA under an Emergency Use Authorization  (EUA). This test is only authorized for the duration of time the  declaration that circumstances exist justifying the authorization of the  emergency use of an in vitro diagnostic tests for detection of SARS-CoV-2  virus and/or diagnosis of COVID-19 infection under section 564(b)(1) of  the Act, 21 U. S.C. 504RBS-7(S)(3), unless the authorization is terminated  or revoked sooner. The test has been validated but independent review by FDA  and CLIA is pending. Test performed using [employer] GeneXpert: This RT-PCR assay targets N2,  a region unique to SARS-CoV-2. A conserved region in the E-gene was chosen  for pan-Sarbecovirus detection which includes SARS-CoV-2. According to CMS-2020-01-R, this platform meets the definition of high-throughput technology. Urine Microscopic [[id number]]  (Abnormal) Collected: 10/09/23 0216    Lab Status: Final result Specimen: Urine, Clean Catch Updated: 10/09/23 0250     RBC, UA 0-1 /hpf      WBC, UA 10-20 /hpf      Epithelial Cells Occasional /hpf      Bacteria, UA Occasional /hpf     Urine culture [[id number]] Collected: 10/09/23 0216    Lab Status:  In process Specimen: Urine, Clean Catch Updated: 10/09/23 0249    UA w Reflex to Microscopic w Reflex to Culture [[id number]]  (Abnormal) Collected: 10/09/23 0216    Lab Status: Final result Specimen: Urine, Clean Catch Updated: 10/09/23 0235     Color, UA Yellow     Clarity, UA Cloudy     Specific Gravity, UA 1.020     pH, UA 5.5     Leukocytes, UA Trace     Nitrite, UA Negative     Protein, UA Trace mg/dl      Glucose, UA Negative mg/dl      Ketones, UA Negative mg/dl      Urobilinogen, UA 0.2 E.U./dl      Bilirubin, UA Negative     Occult Blood, UA Trace-Intact    APTT [[id number]]  (Normal) Collected: 10/09/23 0100    Lab Status: Final result Specimen: Blood from Arm, Right Updated: 10/09/23 0204     PTT 24 seconds     Protime-INR [[id number]]  (Normal) Collected: 10/09/23 0100    Lab Status: Final result Specimen: Blood from Arm, Right Updated: 10/09/23 0204     Protime 13.5 seconds      INR 1.00    Procalcitonin [[id number]]  (Abnormal) Collected: 10/09/23 0100    Lab Status: Final result Specimen: Blood from Arm, Right Updated: 10/09/23 0204     Procalcitonin 0.58 ng/ml     TSH [[id number]]  (Normal) Collected: 10/09/23 0100    Lab Status: Final result Specimen: Blood from Arm, Right Updated: 10/09/23 0204     TSH 3RD GENERATON 0.484 uIU/mL     Comprehensive metabolic panel [[id number]]  (Abnormal) Collected: 10/09/23 0100    Lab Status: Final result Specimen: Blood from Arm, Right Updated: 10/09/23 0148     Sodium 130 mmol/L      Potassium 4.0 mmol/L      Chloride 99 mmol/L      CO2 22 mmol/L      ANION GAP 9 mmol/L      BUN 18 mg/dL      Creatinine 0.93 mg/dL      Glucose 121 mg/dL      Calcium 10.2 mg/dL      AST 20 U/L      ALT 17 U/L      Alkaline Phosphatase 62 U/L      Total Protein 7.7 g/dL      Albumin 4.3 g/dL      Total Bilirubin 0.59 mg/dL      eGFR 60 ml/min/1.73sq m     Narrative:      [place] guidelines for Chronic Kidney Disease (CKD):   •  Stage 1 with normal or high GFR (GFR > 90 mL/min/1.73 square meters)  •  Stage 2 Mild CKD (GFR = 60-89 mL/min/1.73 square meters)  •  Stage 3A Moderate CKD (GFR = 45-59 mL/min/1.73 square meters)  •  Stage 3B Moderate CKD (GFR = 30-44 mL/min/1.73 square meters)  •  Stage 4 Severe CKD (GFR = 15-29 mL/min/1.73 square meters)  •  Stage 5 End Stage CKD (GFR <15 mL/min/1.73 square meters)  Note: GFR calculation is accurate only with a steady state creatinine    Lactic acid, plasma (w/reflex if result > 2.0) [[id number]]  (Normal) Collected: 10/09/23 0100    Lab Status: Final result Specimen: Blood from Arm, Right Updated: 10/09/23 0148     LACTIC ACID 1.7 mmol/L     Narrative:      Result may be elevated if tourniquet was used during collection. CBC and differential [[id number]]  (Abnormal) Collected: 10/09/23 0100    Lab Status: Final result Specimen: Blood from Arm, Right Updated: 10/09/23 0126     WBC 13.27 Thousand/uL      RBC 4.39 Million/uL      Hemoglobin 13.3 g/dL      Hematocrit 39.0 %      MCV 89 fL      MCH 30.3 pg      MCHC 34.1 g/dL      RDW 12.7 %      MPV 9.7 fL      Platelets 169 Thousands/uL      nRBC 0 /100 WBCs      Neutrophils Relative 83 %      Immat GRANS % 1 %      Lymphocytes Relative 7 %      Monocytes Relative 9 %      Eosinophils Relative 0 %      Basophils Relative 0 %      Neutrophils Absolute 11.05 Thousands/µL      Immature Grans Absolute 0.09 Thousand/uL      Lymphocytes Absolute 0.93 Thousands/µL      Monocytes Absolute 1.15 Thousand/µL      Eosinophils Absolute 0.01 Thousand/µL      Basophils Absolute 0.04 Thousands/µL     Blood culture #2 [[id number]] Collected: 10/09/23 0100    Lab Status: In process Specimen: Blood from Hand, Right Updated: 10/09/23 0122    Blood culture #1 [[id number]] Collected: 10/09/23 0100    Lab Status: In process Specimen: Blood from Arm, Right Updated: 10/09/23 0122                 CT abdomen pelvis with contrast   Final Result by Gerson Alva DO (10/09 0309)      Gastric wall thickening. Correlate for gastritis. Hepatic steatosis.       Workstation performed: [id number]         XR chest 2 views   ED Interpretation by Ham Pineda DO (10/09 0139)   No acute findings                 Procedures  ECG 12 Lead Documentation Only    Date/Time: 10/9/2023 1:34 AM    Performed by: Matheus Agudelo DO  Authorized by: Matheus Agudelo DO    Indications / Diagnosis:  Abdominal pain  ECG reviewed by me, the ED Provider: yes    Patient location:  ED  Previous ECG:     Comparison to cardiac monitor: Yes    Interpretation:     Interpretation: normal    Rate:     ECG rate:  97    ECG rate assessment: normal    Rhythm:     Rhythm: sinus rhythm    Ectopy:     Ectopy: none    QRS:     QRS intervals:  Normal  Conduction:     Conduction: normal    ST segments:     ST segments:  Normal  T waves:     T waves: normal               ED Course  ED Course as of 10/09/23 0540   [facility] Oct 09, 2023   0539 Urine Microscopic(!)   0539 UA w Reflex to Microscopic w Reflex to Culture(!)   0539 FLU/RSV/COVID - if FLU/RSV clinically relevant   0539 Protime-INR   0539 APTT   0539 TSH   0539 Procalcitonin(!)   0539 Comprehensive metabolic panel(!)   2956 Lactic acid, plasma (w/reflex if result > 2.0)   0539 CBC and differential(!)   0539 CT abdomen pelvis with contrast   0539 XR chest 2 views   0539 ECG 12 lead                                             Medical Decision Making  Abdominal exam without peritoneal signs. No evidence of acute abdomen at this time. Well-appearing. Given work-up, low suspicion for acute hepatobiliary disease (including acute cholecystitis or cholangitis), acute pancreatitis (negative lipase), PUD (including gastric perforation), acute infectious processes (pneumonia, hepatitis, pyelonephritis), acute appendicitis, vascular catastrophe, bowel obstruction, viscus perforation, ovarian/testicular torsion, or diverticulitis. Presentation not consistent with other acute emergent causes of abdominal pain at this time.   Findings consistent with gastritis and possible UTI, started on medications for both, referral placed for follow-up with gastroenterology, patient advised to follow-up closely with PCP as well, return if symptoms worsen    Chronic gastritis without bleeding, unspecified gastritis type: acute illness or injury  UTI (urinary tract infection): acute illness or injury  Amount and/or Complexity of Data Reviewed  Labs: ordered. Decision-making details documented in ED Course. Radiology: ordered and independent interpretation performed. Decision-making details documented in ED Course. ECG/medicine tests: ordered and independent interpretation performed. Decision-making details documented in ED Course. Risk  OTC drugs. Prescription drug management. Disposition  Final diagnoses:   Chronic gastritis without bleeding, unspecified gastritis type   UTI (urinary tract infection)     Time reflects when diagnosis was documented in both MDM as applicable and the Disposition within this note     Time User Action Codes Description Comment    10/9/2023  3:38 AM Bertha Ross Add [K29.50] Chronic gastritis without bleeding, unspecified gastritis type     10/9/2023  3:38 AM Bertha Ross Add [N39.0] UTI (urinary tract infection)       ED Disposition     ED Disposition   Discharge    Condition   Stable    Date/Time   Mon Oct 9, 2023  3:38 AM    Comment   [facility] [place] discharge to home/self care.                Follow-up Information     Follow up With Specialties Details Why Contact Info    Jamie Oshea MD Gastroenterology  Make appointment to be seen in [address] [place] [address]  [phone]            Discharge Medication List as of 10/9/2023  3:40 AM      START taking these medications    Details   cephalexin (KEFLEX) 500 mg capsule Take 1 capsule (500 mg total) by mouth every 12 (twelve) hours for 10 days, Starting Mon 10/9/2023, Until Thu 10/19/2023, Normal      famotidine (PEPCID) 20 mg tablet Take 1 tablet (20 mg total) by mouth 2 (two) times a day, Starting Mon 10/9/2023, Normal         CONTINUE these medications which have NOT CHANGED    Details   Cholecalciferol 21126 units TABS Take 1 capsule by mouth, Historical Med      exemestane (AROMASIN) 25 MG tablet Take 1 tablet (25 mg total) by mouth daily, Starting Mon 6/18/2018, No Print      ezetimibe (ZETIA) 10 mg tablet Take 1 tablet (10 mg total) by mouth daily, Starting Wed 2/6/2019, Normal      losartan (COZAAR) 100 MG tablet TAKE 1 TABLET (100 MG) BY MOUTH DAILY, Normal      Magnesium Gluconate 550 MG TABS Take 30 mg by mouth, Historical Med      montelukast (SINGULAIR) 10 mg tablet Take 1 tablet (10 mg total) by mouth daily at bedtime, Starting Wed 3/4/2020, Normal      multivitamin (THERAGRAN) TABS Take 1 tablet by mouth, Historical Med      Omega-3 Fatty Acids (FISH OIL PO) Take 2 g by mouth, Historical Med      PROAIR  (90 Base) MCG/ACT inhaler Starting Fri 5/8/2020, Historical Med      thyroid (ARMOUR) 120 MG tablet Take 1 tablet (120 mg total) by mouth daily, Starting Mon 6/15/2020, Normal      vitamin E 100 UNIT capsule Take 100 Units by mouth, Historical Med                 PDMP Review       Value Time User    PDMP Reviewed  Yes 2/21/2020  2:33 PM Gracie Zamorano MD          ED Provider  Electronically Signed by           Blas Garza DO  10/09/23 6944

## 2023-10-10 ENCOUNTER — APPOINTMENT (EMERGENCY)
Dept: CT IMAGING | Facility: HOSPITAL | Age: 73
End: 2023-10-10
Payer: COMMERCIAL

## 2023-10-10 ENCOUNTER — HOSPITAL ENCOUNTER (EMERGENCY)
Facility: HOSPITAL | Age: 73
Discharge: HOME | End: 2023-10-10
Attending: EMERGENCY MEDICINE
Payer: COMMERCIAL

## 2023-10-10 VITALS
WEIGHT: 214.51 LBS | DIASTOLIC BLOOD PRESSURE: 68 MMHG | HEART RATE: 68 BPM | TEMPERATURE: 98.1 F | BODY MASS INDEX: 35.7 KG/M2 | OXYGEN SATURATION: 95 % | SYSTOLIC BLOOD PRESSURE: 137 MMHG | RESPIRATION RATE: 21 BRPM

## 2023-10-10 DIAGNOSIS — R53.1 WEAKNESS: Primary | ICD-10-CM

## 2023-10-10 DIAGNOSIS — N39.0 UTI (URINARY TRACT INFECTION): ICD-10-CM

## 2023-10-10 LAB
ALBUMIN SERPL BCP-MCNC: 3.9 G/DL (ref 3.5–5)
ALP SERPL-CCNC: 56 U/L (ref 34–104)
ALT SERPL W P-5'-P-CCNC: 25 U/L (ref 7–52)
ANION GAP SERPL CALCULATED.3IONS-SCNC: 7 MMOL/L
APTT PPP: 24 SECONDS (ref 23–37)
AST SERPL W P-5'-P-CCNC: 26 U/L (ref 13–39)
ATRIAL RATE: 81 BPM
BACTERIA UR CULT: ABNORMAL
BASOPHILS # BLD AUTO: 0.03 THOUSANDS/ÂΜL (ref 0–0.1)
BASOPHILS NFR BLD AUTO: 0 % (ref 0–1)
BILIRUB SERPL-MCNC: 0.66 MG/DL (ref 0.2–1)
BUN SERPL-MCNC: 17 MG/DL (ref 5–25)
CALCIUM SERPL-MCNC: 9.4 MG/DL (ref 8.4–10.2)
CARDIAC TROPONIN I PNL SERPL HS: 9 NG/L
CHLORIDE SERPL-SCNC: 104 MMOL/L (ref 96–108)
CO2 SERPL-SCNC: 23 MMOL/L (ref 21–32)
CREAT SERPL-MCNC: 0.87 MG/DL (ref 0.6–1.3)
EOSINOPHIL # BLD AUTO: 0.06 THOUSAND/ÂΜL (ref 0–0.61)
EOSINOPHIL NFR BLD AUTO: 1 % (ref 0–6)
ERYTHROCYTE [DISTWIDTH] IN BLOOD BY AUTOMATED COUNT: 13 % (ref 11.6–15.1)
FLUAV RNA RESP QL NAA+PROBE: NEGATIVE
FLUBV RNA RESP QL NAA+PROBE: NEGATIVE
GFR SERPL CREATININE-BSD FRML MDRD: 65 ML/MIN/1.73SQ M
GLUCOSE SERPL-MCNC: 99 MG/DL (ref 65–140)
HCT VFR BLD AUTO: 35.9 % (ref 34.8–46.1)
HGB BLD-MCNC: 12.3 G/DL (ref 11.5–15.4)
IMM GRANULOCYTES # BLD AUTO: 0.02 THOUSAND/UL (ref 0–0.2)
IMM GRANULOCYTES NFR BLD AUTO: 0 % (ref 0–2)
INR PPP: 1.07 (ref 0.84–1.19)
LIPASE SERPL-CCNC: 48 U/L (ref 11–82)
LYMPHOCYTES # BLD AUTO: 1.61 THOUSANDS/ÂΜL (ref 0.6–4.47)
LYMPHOCYTES NFR BLD AUTO: 19 % (ref 14–44)
MAGNESIUM SERPL-MCNC: 2.1 MG/DL (ref 1.9–2.7)
MCH RBC QN AUTO: 30.6 PG (ref 26.8–34.3)
MCHC RBC AUTO-ENTMCNC: 34.3 G/DL (ref 31.4–37.4)
MCV RBC AUTO: 89 FL (ref 82–98)
MONOCYTES # BLD AUTO: 0.97 THOUSAND/ÂΜL (ref 0.17–1.22)
MONOCYTES NFR BLD AUTO: 11 % (ref 4–12)
NEUTROPHILS # BLD AUTO: 5.89 THOUSANDS/ÂΜL (ref 1.85–7.62)
NEUTS SEG NFR BLD AUTO: 69 % (ref 43–75)
NRBC BLD AUTO-RTO: 0 /100 WBCS
P AXIS: 25 DEGREES
PLATELET # BLD AUTO: 145 THOUSANDS/UL (ref 149–390)
PMV BLD AUTO: 9.6 FL (ref 8.9–12.7)
POTASSIUM SERPL-SCNC: 3.6 MMOL/L (ref 3.5–5.3)
PR INTERVAL: 184 MS
PROT SERPL-MCNC: 6.9 G/DL (ref 6.4–8.4)
PROTHROMBIN TIME: 14.2 SECONDS (ref 11.6–14.5)
QRS AXIS: 4 DEGREES
QRSD INTERVAL: 80 MS
QT INTERVAL: 350 MS
QTC INTERVAL: 406 MS
RBC # BLD AUTO: 4.02 MILLION/UL (ref 3.81–5.12)
RSV RNA RESP QL NAA+PROBE: NEGATIVE
S PYO DNA BLD POS QL NAA+NON-PROBE: DETECTED
S PYO DNA THROAT QL NAA+PROBE: NOT DETECTED
SARS-COV-2 RNA RESP QL NAA+PROBE: NEGATIVE
SODIUM SERPL-SCNC: 134 MMOL/L (ref 135–147)
T WAVE AXIS: 60 DEGREES
VENTRICULAR RATE: 81 BPM
WBC # BLD AUTO: 8.58 THOUSAND/UL (ref 4.31–10.16)

## 2023-10-10 PROCEDURE — 85730 THROMBOPLASTIN TIME PARTIAL: CPT | Performed by: EMERGENCY MEDICINE

## 2023-10-10 PROCEDURE — 84484 ASSAY OF TROPONIN QUANT: CPT | Performed by: EMERGENCY MEDICINE

## 2023-10-10 PROCEDURE — 87040 BLOOD CULTURE FOR BACTERIA: CPT | Performed by: EMERGENCY MEDICINE

## 2023-10-10 PROCEDURE — 99285 EMERGENCY DEPT VISIT HI MDM: CPT | Performed by: EMERGENCY MEDICINE

## 2023-10-10 PROCEDURE — 74176 CT ABD & PELVIS W/O CONTRAST: CPT

## 2023-10-10 PROCEDURE — 99285 EMERGENCY DEPT VISIT HI MDM: CPT

## 2023-10-10 PROCEDURE — 93005 ELECTROCARDIOGRAM TRACING: CPT

## 2023-10-10 PROCEDURE — 85025 COMPLETE CBC W/AUTO DIFF WBC: CPT | Performed by: EMERGENCY MEDICINE

## 2023-10-10 PROCEDURE — 36415 COLL VENOUS BLD VENIPUNCTURE: CPT | Performed by: EMERGENCY MEDICINE

## 2023-10-10 PROCEDURE — 71250 CT THORAX DX C-: CPT

## 2023-10-10 PROCEDURE — 87651 STREP A DNA AMP PROBE: CPT | Performed by: EMERGENCY MEDICINE

## 2023-10-10 PROCEDURE — G1004 CDSM NDSC: HCPCS

## 2023-10-10 PROCEDURE — 83690 ASSAY OF LIPASE: CPT | Performed by: EMERGENCY MEDICINE

## 2023-10-10 PROCEDURE — 83735 ASSAY OF MAGNESIUM: CPT | Performed by: EMERGENCY MEDICINE

## 2023-10-10 PROCEDURE — 0241U HB NFCT DS VIR RESP RNA 4 TRGT: CPT | Performed by: EMERGENCY MEDICINE

## 2023-10-10 PROCEDURE — 85610 PROTHROMBIN TIME: CPT | Performed by: EMERGENCY MEDICINE

## 2023-10-10 PROCEDURE — 80053 COMPREHEN METABOLIC PANEL: CPT | Performed by: EMERGENCY MEDICINE

## 2023-10-10 PROCEDURE — 96365 THER/PROPH/DIAG IV INF INIT: CPT

## 2023-10-10 PROCEDURE — 96361 HYDRATE IV INFUSION ADD-ON: CPT

## 2023-10-10 RX ORDER — CEFTRIAXONE 1 G/50ML
1000 INJECTION, SOLUTION INTRAVENOUS ONCE
Status: COMPLETED | OUTPATIENT
Start: 2023-10-10 | End: 2023-10-10

## 2023-10-10 RX ADMIN — SODIUM CHLORIDE 1000 ML: 0.9 INJECTION, SOLUTION INTRAVENOUS at 16:21

## 2023-10-10 RX ADMIN — CEFTRIAXONE 1000 MG: 1 INJECTION, SOLUTION INTRAVENOUS at 17:17

## 2023-10-10 NOTE — ED PROVIDER NOTES
History  Chief Complaint   Patient presents with   • Weakness - Generalized     Patient c/o worsening weakness since yesterday. Patient was seen here 1 days ago for generalized abdominal pain with body aches. Had a cough with slight phlegm production. Had a temperature of 100.9. Chest x-ray showed atelectasis at the left base. CAT scan of the abdomen pelvis showed possible gastritis. Still continues with weakness. Having chills. No fevers. Nothing taken today for her symptoms. Has a cough with occasional yellow phlegm. Blood cultures from 1 days ago showed 2 out of 2 positive for gram-positive cocci. Patient called and returned. Decreased appetite. Increased weakness. Diagnosed with a UTI and placed on Keflex. Patient was given a dose of IV Rocephin prior to discharge 1 days ago. History provided by:  Patient   used: No    Fatigue  Severity:  Mild  Onset quality:  Gradual  Duration:  2 days  Timing:  Constant  Progression:  Worsening  Chronicity:  New  Context: not allergies, not decreased sleep, not pinched nerve and not stress    Relieved by:  Nothing  Worsened by:  Nothing  Ineffective treatments:  None tried  Associated symptoms: anorexia, cough, nausea and vomiting    Associated symptoms: no abdominal pain, no chest pain, no diarrhea, no dizziness, no dysuria, no fever, no foul-smelling urine, no frequency, no headaches, no hematochezia, no melena, no seizures and no shortness of breath        Prior to Admission Medications   Prescriptions Last Dose Informant Patient Reported? Taking?    Cholecalciferol 05566 units TABS  Self Yes No   Sig: Take 1 capsule by mouth   Magnesium Gluconate 550 MG TABS  Self Yes No   Sig: Take 30 mg by mouth   Omega-3 Fatty Acids (FISH OIL PO)  Self Yes No   Sig: Take 2 g by mouth   PROAIR  (90 Base) MCG/ACT inhaler   Yes No   cephalexin (KEFLEX) 500 mg capsule   No No   Sig: Take 1 capsule (500 mg total) by mouth every 12 (twelve) hours for 10 days   exemestane (AROMASIN) 25 MG tablet  Self No No   Sig: Take 1 tablet (25 mg total) by mouth daily   ezetimibe (ZETIA) 10 mg tablet  Self No No   Sig: Take 1 tablet (10 mg total) by mouth daily   Patient not taking: Reported on 10/21/2019   famotidine (PEPCID) 20 mg tablet   No No   Sig: Take 1 tablet (20 mg total) by mouth 2 (two) times a day   losartan (COZAAR) 100 MG tablet   No No   Sig: TAKE 1 TABLET (100 MG) BY MOUTH DAILY   montelukast (SINGULAIR) 10 mg tablet   No No   Sig: Take 1 tablet (10 mg total) by mouth daily at bedtime   multivitamin (THERAGRAN) TABS  Self Yes No   Sig: Take 1 tablet by mouth   thyroid (ARMOUR) 120 MG tablet   No No   Sig: Take 1 tablet (120 mg total) by mouth daily   vitamin E 100 UNIT capsule  Self Yes No   Sig: Take 100 Units by mouth      Facility-Administered Medications: None       Past Medical History:   Diagnosis Date   • Anxiety    • Asthma    • Cancer ([address] [facility])    • Depression    • Disease of thyroid gland    • Hyperlipemia    • Hypertension    • OAB (overactive bladder)        Past Surgical History:   Procedure Laterality Date   • BLADDER SURGERY  2011   • BREAST LUMPECTOMY  2013       Family History   Problem Relation Age of Onset   • Tuberculosis Mother    • Cancer Father         prostate   • Cancer Sister         uterine   • Cancer Paternal Aunt         pancreatic   • Cancer Cousin         pancreatic   • Cancer Brother 46        [facility]     I have reviewed and agree with the history as documented. E-Cigarette/Vaping   • E-Cigarette Use Never User      E-Cigarette/Vaping Substances     Social History     Tobacco Use   • Smoking status: Never   • Smokeless tobacco: Never   Vaping Use   • Vaping Use: Never used   Substance Use Topics   • Alcohol use: No   • Drug use: No       Review of Systems   Constitutional: Positive for fatigue. Negative for chills and fever. HENT: Negative for ear pain, hearing loss, sore throat, trouble swallowing and voice change. Eyes: Negative for pain and discharge. Respiratory: Positive for cough. Negative for shortness of breath and wheezing. Cardiovascular: Negative for chest pain and palpitations. Gastrointestinal: Positive for anorexia, nausea and vomiting. Negative for abdominal pain, blood in stool, constipation, diarrhea, hematochezia and melena. Genitourinary: Negative for dysuria, flank pain, frequency and hematuria. Musculoskeletal: Negative for joint swelling, neck pain and neck stiffness. Skin: Negative for rash and wound. Neurological: Negative for dizziness, seizures, syncope, facial asymmetry and headaches. Psychiatric/Behavioral: Negative for hallucinations, self-injury and suicidal ideas. All other systems reviewed and are negative. Physical Exam  Physical Exam  Vitals and nursing note reviewed. Constitutional:       General: She is not in acute distress. Appearance: She is well-developed. HENT:      Head: Normocephalic and atraumatic. Right Ear: External ear normal.      Left Ear: External ear normal.      Mouth/Throat:      Mouth: Mucous membranes are dry. Eyes:      General: No scleral icterus. Right eye: No discharge. Left eye: No discharge. Extraocular Movements: Extraocular movements intact. Conjunctiva/sclera: Conjunctivae normal.   Cardiovascular:      Rate and Rhythm: Normal rate and regular rhythm. Heart sounds: Normal heart sounds. No murmur heard. Pulmonary:      Effort: Pulmonary effort is normal.      Breath sounds: Normal breath sounds. No wheezing or rales. Abdominal:      General: Bowel sounds are normal. There is no distension. Palpations: Abdomen is soft. Tenderness: There is no abdominal tenderness. There is no guarding or rebound. Musculoskeletal:         General: No deformity. Normal range of motion. Cervical back: Normal range of motion and neck supple. Skin:     General: Skin is warm and dry. Findings: No rash. Neurological:      General: No focal deficit present. Mental Status: She is alert and oriented to person, place, and time. Cranial Nerves: No cranial nerve deficit. Psychiatric:         Mood and Affect: Mood normal.         Behavior: Behavior normal.         Thought Content: Thought content normal.         Judgment: Judgment normal.         Vital Signs  ED Triage Vitals [10/10/23 1554]   Temperature Pulse Respirations Blood Pressure SpO2   98.1 °F (36.7 °C) 62 18 135/60 96 %      Temp Source Heart Rate Source Patient Position - Orthostatic VS BP Location FiO2 (%)   Temporal Monitor Lying Right arm --      Pain Score       --           Vitals:    10/10/23 1554   BP: 135/60   Pulse: 62   Patient Position - Orthostatic VS: Lying         Visual Acuity      ED Medications  Medications   sodium chloride 0.9 % bolus 1,000 mL (1,000 mL Intravenous New Bag 10/10/23 1621)   cefTRIAXone (ROCEPHIN) IVPB (premix in dextrose) 1,000 mg 50 mL (has no administration in time range)       Diagnostic Studies  Results Reviewed     Procedure Component Value Units Date/Time    FLU/RSV/COVID - if FLU/RSV clinically relevant [[id number]]  (Normal) Collected: 10/10/23 1611    Lab Status: Final result Specimen: Nares from Nose Updated: 10/10/23 1706     SARS-CoV-2 Negative     INFLUENZA A PCR Negative     INFLUENZA B PCR Negative     RSV PCR Negative    Narrative:      FOR PEDIATRIC PATIENTS - copy/paste COVID Guidelines URL to browser: [email or web address]    SARS-CoV-2 assay is a Nucleic Acid Amplification assay intended for the  qualitative detection of nucleic acid from SARS-CoV-2 in nasopharyngeal  swabs. Results are for the presumptive identification of SARS-CoV-2 RNA. Positive results are indicative of infection with SARS-CoV-2, the virus  causing COVID-19, but do not rule out bacterial infection or co-infection  with other viruses.  Laboratories within the [facility] and its  territories are required to report all positive results to the appropriate  public health authorities. Negative results do not preclude SARS-CoV-2  infection and should not be used as the sole basis for treatment or other  patient management decisions. Negative results must be combined with  clinical observations, patient history, and epidemiological information. This test has not been FDA cleared or approved. This test has been authorized by FDA under an Emergency Use Authorization  (EUA). This test is only authorized for the duration of time the  declaration that circumstances exist justifying the authorization of the  emergency use of an in vitro diagnostic tests for detection of SARS-CoV-2  virus and/or diagnosis of COVID-19 infection under section 564(b)(1) of  the Act, 21 U. S.C. 827SQI-8(R)(4), unless the authorization is terminated  or revoked sooner. The test has been validated but independent review by FDA  and CLIA is pending. Test performed using [employer] GeneXpert: This RT-PCR assay targets N2,  a region unique to SARS-CoV-2. A conserved region in the E-gene was chosen  for pan-Sarbecovirus detection which includes SARS-CoV-2. According to CMS-2020-01-R, this platform meets the definition of high-throughput technology.     HS Troponin I 2hr [[id number]]     Lab Status: No result Specimen: Blood     HS Troponin 0hr (reflex protocol) [[id number]]  (Normal) Collected: 10/10/23 1611    Lab Status: Final result Specimen: Blood from Arm, Right Updated: 10/10/23 1656     hs TnI 0hr 9 ng/L     Comprehensive metabolic panel [[id number]]  (Abnormal) Collected: 10/10/23 1611    Lab Status: Final result Specimen: Blood from Arm, Right Updated: 10/10/23 1653     Sodium 134 mmol/L      Potassium 3.6 mmol/L      Chloride 104 mmol/L      CO2 23 mmol/L      ANION GAP 7 mmol/L      BUN 17 mg/dL      Creatinine 0.87 mg/dL      Glucose 99 mg/dL      Calcium 9.4 mg/dL      AST 26 U/L ALT 25 U/L      Alkaline Phosphatase 56 U/L      Total Protein 6.9 g/dL      Albumin 3.9 g/dL      Total Bilirubin 0.66 mg/dL      eGFR 65 ml/min/1.73sq m     Narrative:      [facility] Disease (CKD):   •  Stage 1 with normal or high GFR (GFR > 90 mL/min/1.73 square meters)  •  Stage 2 Mild CKD (GFR = 60-89 mL/min/1.73 square meters)  •  Stage 3A Moderate CKD (GFR = 45-59 mL/min/1.73 square meters)  •  Stage 3B Moderate CKD (GFR = 30-44 mL/min/1.73 square meters)  •  Stage 4 Severe CKD (GFR = 15-29 mL/min/1.73 square meters)  •  Stage 5 End Stage CKD (GFR <15 mL/min/1.73 square meters)  Note: GFR calculation is accurate only with a steady state creatinine    Lipase [[id number]]  (Normal) Collected: 10/10/23 1611    Lab Status: Final result Specimen: Blood from Arm, Right Updated: 10/10/23 1653     Lipase 48 u/L     Magnesium [[id number]]  (Normal) Collected: 10/10/23 1611    Lab Status: Final result Specimen: Blood from Arm, Right Updated: 10/10/23 1653     Magnesium 2.1 mg/dL     Strep A PCR [[id number]]  (Normal) Collected: 10/10/23 1619    Lab Status: Final result Specimen: Throat Updated: 10/10/23 1652     STREP A PCR Not Detected    Protime-INR [[id number]]  (Normal) Collected: 10/10/23 1611    Lab Status: Final result Specimen: Blood from Arm, Right Updated: 10/10/23 1636     Protime 14.2 seconds      INR 1.07    APTT [[id number]]  (Normal) Collected: 10/10/23 1611    Lab Status: Final result Specimen: Blood from Arm, Right Updated: 10/10/23 1636     PTT 24 seconds     CBC and differential [[id number]]  (Abnormal) Collected: 10/10/23 1611    Lab Status: Final result Specimen: Blood from Arm, Right Updated: 10/10/23 1625     WBC 8.58 Thousand/uL      RBC 4.02 Million/uL      Hemoglobin 12.3 g/dL      Hematocrit 35.9 %      MCV 89 fL      MCH 30.6 pg      MCHC 34.3 g/dL      RDW 13.0 %      MPV 9.6 fL      Platelets 972 Thousands/uL      nRBC 0 /100 WBCs Neutrophils Relative 69 %      Immat GRANS % 0 %      Lymphocytes Relative 19 %      Monocytes Relative 11 %      Eosinophils Relative 1 %      Basophils Relative 0 %      Neutrophils Absolute 5.89 Thousands/µL      Immature Grans Absolute 0.02 Thousand/uL      Lymphocytes Absolute 1.61 Thousands/µL      Monocytes Absolute 0.97 Thousand/µL      Eosinophils Absolute 0.06 Thousand/µL      Basophils Absolute 0.03 Thousands/µL     Blood culture #2 [[id number]] Collected: 10/10/23 1619    Lab Status: In process Specimen: Blood from Arm, Right Updated: 10/10/23 1624    Blood culture #1 [[id number]] Collected: 10/10/23 1611    Lab Status: In process Specimen: Blood from Arm, Right Updated: 10/10/23 1624    UA w Reflex to Microscopic w Reflex to Culture [[id number]]     Lab Status: No result Specimen: Urine                  CT chest abdomen pelvis wo contrast   Final Result by Shanna Musa MD (10/10 1704)   Fibrotic changes in the lungs as above, likely post radiative. On this background, no obvious pneumonia. Correlate with clinical findings. Inhomogeneous attenuation of the lungs with a few scattered superimposed small groundglass opacities. This is age-indeterminate. Atypical pneumonia including COVID is considered. Comparison with prior outside studies would be helpful, if available. Consider follow-up in 6 to 8 weeks for resolution. Limited evaluation of abdomen and pelvis without IV contrast.   Minimal fat haziness around the proximal left ureter and urinary bladder. Correlate to exclude symptoms of UTI. Thickening and stranding in the posterior right pelvic sidewall fat. No drainable collection. No obvious etiology. Given hysterectomy and nonvisualization of the ovaries, this may be chronic postoperative or postradiation. Correlate with symptoms and    detailed prior medical/surgical history. Again, comparison with prior remote studies would be helpful in further assessment.                      Workstation performed: [id number]                    Procedures  ECG 12 Lead Documentation Only    Date/Time: 10/10/2023 4:07 PM    Performed by: Teresita Alejo MD  Authorized by: Teresita Alejo MD    ECG reviewed by me, the ED Provider: yes    Patient location:  ED  Rate:     ECG rate:  80  Rhythm:     Rhythm: sinus rhythm    Ectopy:     Ectopy: none    QRS:     QRS axis:  Normal             ED Course  ED Course as of 10/10/23 [facility] Oct 10, 2023   1709 Discussed with patient about her lab and CAT scan results. Patient feels better. White count is better today. Patient is currently on Keflex for a urinary tract infection. This would cover the gram-positive cocci seen in her blood cultures. Patient was given the option of staying in the hospital for IV antibiotics. She would prefer to go home. Will return her symptoms worsen. SBIRT 22yo+    Flowsheet Row Most Recent Value   Initial Alcohol Screen: US AUDIT-C     1. How often do you have a drink containing alcohol? 0 Filed at: 10/10/2023 1559   2. How many drinks containing alcohol do you have on a typical day you are drinking? 0 Filed at: 10/10/2023 1559   3b. FEMALE Any Age, or MALE 65+: How often do you have 4 or more drinks on one occassion? 0 Filed at: 10/10/2023 1559   Audit-C Score 0 Filed at: 10/10/2023 1559   DENISA: How many times in the past year have you. .. Used an illegal drug or used a prescription medication for non-medical reasons? Never Filed at: 10/10/2023 1559                    Medical Decision Making  Amount and/or Complexity of Data Reviewed  Labs: ordered. Decision-making details documented in ED Course. Radiology: ordered. Decision-making details documented in ED Course. ECG/medicine tests: ordered and independent interpretation performed. Decision-making details documented in ED Course.   Discussion of management or test interpretation with external provider(s): Personal diagnosis includes but not limited to sepsis, UTI, pharyngitis, pneumonia, bronchitis, viral infection. Disposition  Final diagnoses:   Weakness   UTI (urinary tract infection)     Time reflects when diagnosis was documented in both MDM as applicable and the Disposition within this note     Time User Action Codes Description Comment    10/10/2023  5:11 PM Princess Sanchez Add [R53.1] Weakness     10/10/2023  5:12 PM Princess Sanchez Add [N39.0] UTI (urinary tract infection)       ED Disposition     ED Disposition   Discharge    Condition   Stable    Date/Time   Tue Oct 10, 2023  5:11 PM    [address] [place] [address] discharge to home/self care. Follow-up Information     Follow up With Specialties Details Why Contact Info    Steve Magallon DO Family Medicine Call in 1 day  [address] [place] [address] [address]  [phone]            Patient's Medications   Discharge Prescriptions    No medications on file       No discharge procedures on file.     PDMP Review       Value Time User    PDMP Reviewed  Yes 2/21/2020  2:33 PM Tasha Espino MD          ED Provider  Electronically Signed by           Teresita Alejo MD  10/10/23 3792

## 2023-10-10 NOTE — ED PROVIDER NOTES
History  Chief Complaint   Patient presents with   • Abdominal Pain     Pt c/o generalized abdominal pain and vomiting x2 days. Patient is a 78-year-old female presenting to the emergency department complaining of generalized abdominal pain with nausea and vomiting for the past 2 days, she has had cough and congestion, generalized body aches and fevers, no known sick contacts, reports she was recently evaluated by PCP and work-up was unremarkable but symptoms persist    Abdominal Pain  Associated symptoms include a fever, nausea and vomiting. Prior to Admission Medications   Prescriptions Last Dose Informant Patient Reported? Taking?    Cholecalciferol 98109 units TABS  Self Yes No   Sig: Take 1 capsule by mouth   Magnesium Gluconate 550 MG TABS  Self Yes No   Sig: Take 30 mg by mouth   Omega-3 Fatty Acids (FISH OIL PO)  Self Yes No   Sig: Take 2 g by mouth   PROAIR  (90 Base) MCG/ACT inhaler   Yes No   exemestane (AROMASIN) 25 MG tablet  Self No No   Sig: Take 1 tablet (25 mg total) by mouth daily   ezetimibe (ZETIA) 10 mg tablet  Self No No   Sig: Take 1 tablet (10 mg total) by mouth daily   Patient not taking: Reported on 10/21/2019   losartan (COZAAR) 100 MG tablet   No No   Sig: TAKE 1 TABLET (100 MG) BY MOUTH DAILY   montelukast (SINGULAIR) 10 mg tablet   No No   Sig: Take 1 tablet (10 mg total) by mouth daily at bedtime   multivitamin (THERAGRAN) TABS  Self Yes No   Sig: Take 1 tablet by mouth   thyroid (ARMOUR) 120 MG tablet   No No   Sig: Take 1 tablet (120 mg total) by mouth daily   vitamin E 100 UNIT capsule  Self Yes No   Sig: Take 100 Units by mouth      Facility-Administered Medications: None       Past Medical History:   Diagnosis Date   • Anxiety    • Asthma    • Cancer ([address])    • Depression    • Disease of thyroid gland    • Hyperlipemia    • Hypertension    • OAB (overactive bladder)        Past Surgical History:   Procedure Laterality Date   • BLADDER SURGERY  2011   • BREAST LUMPECTOMY  2013       Family History   Problem Relation Age of Onset   • Tuberculosis Mother    • Cancer Father         prostate   • Cancer Sister         uterine   • Cancer Paternal Aunt         pancreatic   • Cancer Cousin         pancreatic   • Cancer Brother 46        bertrandaast     I have reviewed and agree with the history as documented. E-Cigarette/Vaping     E-Cigarette/Vaping Substances     Social History     Tobacco Use   • Smoking status: Never   • Smokeless tobacco: Never   Substance Use Topics   • Alcohol use: No   • Drug use: No       Review of Systems   Constitutional: Positive for chills, fatigue and fever. HENT: Positive for congestion. Eyes: Negative. Respiratory: Positive for cough. Cardiovascular: Negative. Gastrointestinal: Positive for abdominal pain, nausea and vomiting. Endocrine: Negative. Genitourinary: Negative. Musculoskeletal: Negative. Skin: Negative. Allergic/Immunologic: Negative. Neurological: Negative. Hematological: Negative. Psychiatric/Behavioral: Negative. Physical Exam  Physical Exam  Constitutional:       Appearance: She is well-developed. She is ill-appearing. HENT:      Head: Normocephalic and atraumatic. Eyes:      Conjunctiva/sclera: Conjunctivae normal.      Pupils: Pupils are equal, round, and reactive to light. Cardiovascular:      Rate and Rhythm: Normal rate. Pulmonary:      Effort: Pulmonary effort is normal.      Breath sounds: Examination of the left-middle field reveals rhonchi. Rhonchi present. Abdominal:      Palpations: Abdomen is soft. Tenderness: There is generalized abdominal tenderness. Musculoskeletal:         General: Normal range of motion. Cervical back: Normal range of motion and neck supple. Skin:     General: Skin is warm and dry. Neurological:      Mental Status: She is alert and oriented to person, place, and time.          Vital Signs  ED Triage Vitals   Temperature Pulse Respirations Blood Pressure SpO2   10/09/23 0032 10/09/23 0032 10/09/23 0032 10/09/23 0032 10/09/23 0032   (!) 100.9 °F (38.3 °C) (!) 106 20 (!) 198/78 95 %      Temp Source Heart Rate Source Patient Position - Orthostatic VS BP Location FiO2 (%)   10/09/23 0032 10/09/23 0032 10/09/23 0032 10/09/23 0032 --   Oral Monitor Sitting Left arm       Pain Score       10/09/23 0114       Med Not Given for Pain - for MAR use only           Vitals:    10/09/23 0200 10/09/23 0215 10/09/23 0230 10/09/23 0330   BP: 142/70 142/63 146/66 134/65   Pulse: 87 102 85 95   Patient Position - Orthostatic VS:             Visual Acuity      ED Medications  Medications   sodium chloride 0.9 % bolus 1,000 mL (0 mL Intravenous Stopped 10/9/23 0216)   acetaminophen (TYLENOL) tablet 650 mg (650 mg Oral Given 10/9/23 0114)   cefTRIAXone (ROCEPHIN) IVPB (premix in dextrose) 1,000 mg 50 mL (0 mg Intravenous Stopped 10/9/23 0140)   iohexol (OMNIPAQUE) 350 MG/ML injection (SINGLE-DOSE) 100 mL (100 mL Intravenous Given 10/9/23 0259)   Famotidine (PF) (PEPCID) injection 20 mg (20 mg Intravenous Given 10/9/23 0314)       Diagnostic Studies  Results Reviewed     Procedure Component Value Units Date/Time    Urine culture [[id number]]  (Abnormal) Collected: 10/09/23 0216    Lab Status: Final result Specimen: Urine, Clean Catch Updated: 10/10/23 1325     Urine Culture 40,000-49,000 cfu/ml Alpha Hemolytic Streptococcus NOT Enterococcus    Blood culture #2 [[id number]]  (Abnormal) Collected: 10/09/23 0100    Lab Status: Preliminary result Specimen: Blood from Hand, Right Updated: 10/10/23 1035     Blood Culture Beta Hemolytic Streptococcus Group A     Gram Stain Result Gram positive cocci in chains    Blood Culture Identification Panel [[id number]]  (Abnormal) Collected: 10/09/23 0100    Lab Status: Final result Specimen: Blood from Arm, Right Updated: 10/10/23 1027     Streptococcus Pyogenes (Group A) Detected    Narrative:      Film Array panel tests for 11 gram positive organisms, 15 gram negative organisms, 7 yeast species and 10 resistance genes. Blood culture #1 [[id number]]  (Abnormal) Collected: 10/09/23 0100    Lab Status: Preliminary result Specimen: Blood from Arm, Right Updated: 10/10/23 1027     Blood Culture Streptococcus pyogenes (Group A)     Gram Stain Result Gram positive cocci in chains    FLU/RSV/COVID - if FLU/RSV clinically relevant [[id number]]  (Normal) Collected: 10/09/23 0100    Lab Status: Final result Specimen: Nares from Nose Updated: 10/09/23 0329     SARS-CoV-2 Negative     INFLUENZA A PCR Negative     INFLUENZA B PCR Negative     RSV PCR Negative    Narrative:      FOR PEDIATRIC PATIENTS - copy/paste COVID Guidelines URL to browser: [email or web address]    SARS-CoV-2 assay is a Nucleic Acid Amplification assay intended for the  qualitative detection of nucleic acid from SARS-CoV-2 in nasopharyngeal  swabs. Results are for the presumptive identification of SARS-CoV-2 RNA. Positive results are indicative of infection with SARS-CoV-2, the virus  causing COVID-19, but do not rule out bacterial infection or co-infection  with other viruses. Laboratories within the [facility] and its  territories are required to report all positive results to the appropriate  public health authorities. Negative results do not preclude SARS-CoV-2  infection and should not be used as the sole basis for treatment or other  patient management decisions. Negative results must be combined with  clinical observations, patient history, and epidemiological information. This test has not been FDA cleared or approved. This test has been authorized by FDA under an Emergency Use Authorization  (EUA).  This test is only authorized for the duration of time the  declaration that circumstances exist justifying the authorization of the  emergency use of an in vitro diagnostic tests for detection of SARS-CoV-2  virus and/or diagnosis of COVID-19 infection under section 564(b)(1) of  the Act, 21 U. S.C. 636GGZ-4(M)(4), unless the authorization is terminated  or revoked sooner. The test has been validated but independent review by FDA  and CLIA is pending. Test performed using [employer] GeneXpert: This RT-PCR assay targets N2,  a region unique to SARS-CoV-2. A conserved region in the E-gene was chosen  for pan-Sarbecovirus detection which includes SARS-CoV-2. According to CMS-2020-01-R, this platform meets the definition of high-throughput technology.     Urine Microscopic [[id number]]  (Abnormal) Collected: 10/09/23 0216    Lab Status: Final result Specimen: Urine, Clean Catch Updated: 10/09/23 0250     RBC, UA 0-1 /hpf      WBC, UA 10-20 /hpf      Epithelial Cells Occasional /hpf      Bacteria, UA Occasional /hpf     UA w Reflex to Microscopic w Reflex to Culture [[id number]]  (Abnormal) Collected: 10/09/23 0216    Lab Status: Final result Specimen: Urine, Clean Catch Updated: 10/09/23 0235     Color, UA Yellow     Clarity, UA Cloudy     Specific Gravity, UA 1.020     pH, UA 5.5     Leukocytes, UA Trace     Nitrite, UA Negative     Protein, UA Trace mg/dl      Glucose, UA Negative mg/dl      Ketones, UA Negative mg/dl      Urobilinogen, UA 0.2 E.U./dl      Bilirubin, UA Negative     Occult Blood, UA Trace-Intact    APTT [[id number]]  (Normal) Collected: 10/09/23 0100    Lab Status: Final result Specimen: Blood from Arm, Right Updated: 10/09/23 0204     PTT 24 seconds     Protime-INR [[id number]]  (Normal) Collected: 10/09/23 0100    Lab Status: Final result Specimen: Blood from Arm, Right Updated: 10/09/23 0204     Protime 13.5 seconds      INR 1.00    Procalcitonin [[id number]]  (Abnormal) Collected: 10/09/23 0100    Lab Status: Final result Specimen: Blood from Arm, Right Updated: 10/09/23 0204     Procalcitonin 0.58 ng/ml     TSH [[id number]]  (Normal) Collected: 10/09/23 0100    Lab Status: Final result Specimen: Blood from Arm, Right Updated: 10/09/23 0204     TSH 3RD GENERATON 0.484 uIU/mL     Comprehensive metabolic panel [[id number]]  (Abnormal) Collected: 10/09/23 0100    Lab Status: Final result Specimen: Blood from Arm, Right Updated: 10/09/23 0148     Sodium 130 mmol/L      Potassium 4.0 mmol/L      Chloride 99 mmol/L      CO2 22 mmol/L      ANION GAP 9 mmol/L      BUN 18 mg/dL      Creatinine 0.93 mg/dL      Glucose 121 mg/dL      Calcium 10.2 mg/dL      AST 20 U/L      ALT 17 U/L      Alkaline Phosphatase 62 U/L      Total Protein 7.7 g/dL      Albumin 4.3 g/dL      Total Bilirubin 0.59 mg/dL      eGFR 60 ml/min/1.73sq m     Narrative:      [place] guidelines for Chronic Kidney Disease (CKD):   •  Stage 1 with normal or high GFR (GFR > 90 mL/min/1.73 square meters)  •  Stage 2 Mild CKD (GFR = 60-89 mL/min/1.73 square meters)  •  Stage 3A Moderate CKD (GFR = 45-59 mL/min/1.73 square meters)  •  Stage 3B Moderate CKD (GFR = 30-44 mL/min/1.73 square meters)  •  Stage 4 Severe CKD (GFR = 15-29 mL/min/1.73 square meters)  •  Stage 5 End Stage CKD (GFR <15 mL/min/1.73 square meters)  Note: GFR calculation is accurate only with a steady state creatinine    Lactic acid, plasma (w/reflex if result > 2.0) [[id number]]  (Normal) Collected: 10/09/23 0100    Lab Status: Final result Specimen: Blood from Arm, Right Updated: 10/09/23 0148     LACTIC ACID 1.7 mmol/L     Narrative:      Result may be elevated if tourniquet was used during collection.     CBC and differential [[id number]]  (Abnormal) Collected: 10/09/23 0100    Lab Status: Final result Specimen: Blood from Arm, Right Updated: 10/09/23 0126     WBC 13.27 Thousand/uL      RBC 4.39 Million/uL      Hemoglobin 13.3 g/dL      Hematocrit 39.0 %      MCV 89 fL      MCH 30.3 pg      MCHC 34.1 g/dL      RDW 12.7 %      MPV 9.7 fL      Platelets 606 Thousands/uL      nRBC 0 /100 WBCs      Neutrophils Relative 83 %      Immat GRANS % 1 %      Lymphocytes Relative 7 % Monocytes Relative 9 %      Eosinophils Relative 0 %      Basophils Relative 0 %      Neutrophils Absolute 11.05 Thousands/µL      Immature Grans Absolute 0.09 Thousand/uL      Lymphocytes Absolute 0.93 Thousands/µL      Monocytes Absolute 1.15 Thousand/µL      Eosinophils Absolute 0.01 Thousand/µL      Basophils Absolute 0.04 Thousands/µL                  CT abdomen pelvis with contrast   Final Result by Jyoti Colvin DO (10/09 0309)      Gastric wall thickening. Correlate for gastritis. Hepatic steatosis. Workstation performed: [id number]         XR chest 2 views   ED Interpretation by Jere Bahena DO (10/09 0139)   No acute findings      Final Result by Roman Ferrera MD (10/09 6115)      Scarring or atelectasis at the left lung base. Workstation performed: [id number]                    Procedures  Procedures         ED Course                                             Medical Decision Making      Disposition  Final diagnoses:   Chronic gastritis without bleeding, unspecified gastritis type   UTI (urinary tract infection)     Time reflects when diagnosis was documented in both MDM as applicable and the Disposition within this note     Time User Action Codes Description Comment    10/9/2023  3:38 AM Zoraida Ross Add [K29.50] Chronic gastritis without bleeding, unspecified gastritis type     10/9/2023  3:38 AM Zoraida Ross Add [N39.0] UTI (urinary tract infection)       ED Disposition     ED Disposition   Discharge    Condition   Stable    Date/Time   Mon Oct 9, 2023  3:38 AM    Comment   Ana Rosa Cox discharge to home/self care.                Follow-up Information     Follow up With Specialties Details Why Contact Info    Jamie Sun MD Gastroenterology  Make appointment to be seen in [address] [place] [address]  [phone]            Discharge Medication List as of 10/9/2023  3:40 AM      START taking these medications    Details cephalexin (KEFLEX) 500 mg capsule Take 1 capsule (500 mg total) by mouth every 12 (twelve) hours for 10 days, Starting Mon 10/9/2023, Until Thu 10/19/2023, Normal      famotidine (PEPCID) 20 mg tablet Take 1 tablet (20 mg total) by mouth 2 (two) times a day, Starting Mon 10/9/2023, Normal         CONTINUE these medications which have NOT CHANGED    Details   Cholecalciferol 88217 units TABS Take 1 capsule by mouth, Historical Med      exemestane (AROMASIN) 25 MG tablet Take 1 tablet (25 mg total) by mouth daily, Starting Mon 6/18/2018, No Print      ezetimibe (ZETIA) 10 mg tablet Take 1 tablet (10 mg total) by mouth daily, Starting Wed 2/6/2019, Normal      losartan (COZAAR) 100 MG tablet TAKE 1 TABLET (100 MG) BY MOUTH DAILY, Normal      Magnesium Gluconate 550 MG TABS Take 30 mg by mouth, Historical Med      montelukast (SINGULAIR) 10 mg tablet Take 1 tablet (10 mg total) by mouth daily at bedtime, Starting Wed 3/4/2020, Normal      multivitamin (THERAGRAN) TABS Take 1 tablet by mouth, Historical Med      Omega-3 Fatty Acids (FISH OIL PO) Take 2 g by mouth, Historical Med      PROAIR  (90 Base) MCG/ACT inhaler Starting Fri 5/8/2020, Historical Med      thyroid (ARMOUR) 120 MG tablet Take 1 tablet (120 mg total) by mouth daily, Starting Mon 6/15/2020, Normal      vitamin E 100 UNIT capsule Take 100 Units by mouth, Historical Med                 PDMP Review       Value Time User    PDMP Reviewed  Yes 2/21/2020  2:33 PM Opal Hauser MD          ED Provider  Electronically Signed by           Shannon Yañez DO  10/09/23 8896

## 2023-10-10 NOTE — DISCHARGE INSTRUCTIONS
When you were here the other day for your visit, you were diagnosed with a urinary tract infection and placed on an antibiotic called cephalexin. You were also placed on a medicine for inflammation with your stomach called famotidine. Continue to take them. Your blood cultures at that time showed you had bacteria growing in your blood. You were given a dose of IV antibiotics before you are discharged. The cephalexin should help take care of this. Please return if symptoms worsen.

## 2023-10-11 LAB
BACTERIA BLD CULT: ABNORMAL
BACTERIA BLD CULT: ABNORMAL
GRAM STN SPEC: ABNORMAL
GRAM STN SPEC: ABNORMAL

## 2023-10-15 LAB
BACTERIA BLD CULT: NORMAL
BACTERIA BLD CULT: NORMAL

## 2024-11-16 NOTE — RESULT NOTES
The patient is Stable - Low risk of patient condition declining or worsening    Shift Goals  Clinical Goals: pulmonary hygiene, safety, stable vital signs  Patient Goals: pain control, discharge  Family Goals: not present    Problem: Knowledge Deficit - Standard  Goal: Patient and family/care givers will demonstrate understanding of plan of care, disease process/condition, diagnostic tests and medications  Description: Target End Date:  1-3 days or as soon as patient condition allows  1.  Patient and family/caregiver oriented to unit, equipment, visitation policy and means for communicating concern  2.  Complete/review Learning Assessment  3.  Assess knowledge level of disease process/condition, treatment plan, diagnostic tests and medications  4.  Explain disease process/condition, treatment plan, diagnostic tests and medications  Outcome: Progressing     Problem: Self Care  Goal: Patient will have the ability to perform ADLs independently or with assistance (bathe, groom, dress, toilet and feed)  Description: Target End Date:  Prior to discharge or change in level of care  1.  Assess the capability and level of deficiency to perform ADLs  2.  Encourage family/care giver involvement  3.  Provide assistive devices  Outcome: Progressing     Problem: Pain - Standard  Goal: Alleviation of pain or a reduction in pain to the patient’s comfort goal  Description: Target End Date:  Prior to discharge or change in level of care  1.  Document pain using the appropriate pain scale per order or unit policy  2.  Educate and implement non-pharmacologic comfort measures (i.e. relaxation, distraction, massage, cold/heat therapy, etc.)  3.  Pain management medications as ordered  4.  Reassess pain after pain med administration per policy  5.  If opiods administered assess patient's response to pain medication is appropriate per POSS sedation scale  Outcome: Progressing     Problem: Fall Risk  Goal: Patient will remain free from  Verified Results  (1) URINE CULTURE [id number] 07:39AM Rashaun Le Order Number: [id number]     Test Name Result Flag Reference   CLINICAL REPORT (Report)     Test:        Urine culture  Specimen Type:   Urine  Specimen Date:   5/5/2017 7:39 AM  Result Date:    5/6/2017 7:59 AM  Result Status:   Final result  Resulting Lab:   [place] [address]            Tel: [phone]      CULTURE                                       ------------------                                   No Growth <1000 cfu/mL falls  Description: Target End Date:  Prior to discharge or change in level of care  1.  Assess for fall risk factors  2.  Implement fall precautions  Outcome: Progressing